# Patient Record
Sex: FEMALE | Race: WHITE | Employment: OTHER | URBAN - METROPOLITAN AREA
[De-identification: names, ages, dates, MRNs, and addresses within clinical notes are randomized per-mention and may not be internally consistent; named-entity substitution may affect disease eponyms.]

---

## 2018-01-08 ENCOUNTER — OFFICE VISIT (OUTPATIENT)
Dept: OBGYN CLINIC | Age: 28
End: 2018-01-08
Payer: MEDICAID

## 2018-01-08 DIAGNOSIS — Z97.5 IUD (INTRAUTERINE DEVICE) IN PLACE: ICD-10-CM

## 2018-01-08 PROCEDURE — 1036F TOBACCO NON-USER: CPT | Performed by: OBSTETRICS & GYNECOLOGY

## 2018-01-08 PROCEDURE — 99203 OFFICE O/P NEW LOW 30 MIN: CPT | Performed by: OBSTETRICS & GYNECOLOGY

## 2018-01-08 PROCEDURE — G8421 BMI NOT CALCULATED: HCPCS | Performed by: OBSTETRICS & GYNECOLOGY

## 2018-01-08 PROCEDURE — G8484 FLU IMMUNIZE NO ADMIN: HCPCS | Performed by: OBSTETRICS & GYNECOLOGY

## 2018-01-08 PROCEDURE — G8427 DOCREV CUR MEDS BY ELIG CLIN: HCPCS | Performed by: OBSTETRICS & GYNECOLOGY

## 2018-01-08 RX ORDER — CALCIUM CARBONATE 500(1250)
500 TABLET ORAL DAILY
COMMUNITY
End: 2018-05-09

## 2018-01-08 RX ORDER — PREDNISONE 1 MG/1
5 TABLET ORAL DAILY
COMMUNITY
End: 2018-05-09

## 2018-01-08 RX ORDER — FLUOXETINE HYDROCHLORIDE 20 MG/1
20 CAPSULE ORAL DAILY
COMMUNITY
End: 2018-05-09

## 2018-01-08 NOTE — PROGRESS NOTES
will refer to PCP in [place] since she does not have one at this time. )   as well as  counseling on preventative health maintenance follow-up.

## 2018-05-09 ENCOUNTER — HOSPITAL ENCOUNTER (OUTPATIENT)
Age: 28
Setting detail: SPECIMEN
Discharge: HOME | End: 2018-05-09
Payer: MEDICAID

## 2018-05-09 ENCOUNTER — OFFICE VISIT (OUTPATIENT)
Dept: OBGYN CLINIC | Age: 28
End: 2018-05-09
Payer: MEDICAID

## 2018-05-09 VITALS
BODY MASS INDEX: 22.02 KG/M2 | WEIGHT: 129 LBS | HEIGHT: 64 IN | DIASTOLIC BLOOD PRESSURE: 70 MMHG | SYSTOLIC BLOOD PRESSURE: 124 MMHG

## 2018-05-09 DIAGNOSIS — Z01.419 PAP SMEAR, AS PART OF ROUTINE GYNECOLOGICAL EXAMINATION: Primary | ICD-10-CM

## 2018-05-09 DIAGNOSIS — Z30.432 ENCOUNTER FOR IUD REMOVAL: ICD-10-CM

## 2018-05-09 DIAGNOSIS — N92.1 IRREGULAR INTERMENSTRUAL BLEEDING: ICD-10-CM

## 2018-05-09 PROCEDURE — 58301 REMOVE INTRAUTERINE DEVICE: CPT | Performed by: OBSTETRICS & GYNECOLOGY

## 2018-05-09 PROCEDURE — 99395 PREV VISIT EST AGE 18-39: CPT | Performed by: OBSTETRICS & GYNECOLOGY

## 2018-05-09 RX ORDER — BIOTIN 10 MG
10 TABLET ORAL DAILY
COMMUNITY
End: 2020-09-04

## 2018-05-09 RX ORDER — MEDROXYPROGESTERONE ACETATE 150 MG/ML
150 INJECTION, SUSPENSION INTRAMUSCULAR
Qty: 1 ML | Refills: 3 | Status: SHIPPED | OUTPATIENT
Start: 2018-05-09 | End: 2019-03-21 | Stop reason: SDUPTHER

## 2018-05-15 ENCOUNTER — NURSE ONLY (OUTPATIENT)
Dept: OBGYN CLINIC | Age: 28
End: 2018-05-15
Payer: MEDICAID

## 2018-05-15 DIAGNOSIS — Z01.812 PRE-PROCEDURE LAB EXAM: ICD-10-CM

## 2018-05-15 DIAGNOSIS — N92.6 IRREGULAR MENSTRUAL BLEEDING: Primary | ICD-10-CM

## 2018-05-15 LAB
CONTROL: PRESENT
PREGNANCY TEST URINE, POC: NEGATIVE

## 2018-05-15 PROCEDURE — 99211 OFF/OP EST MAY X REQ PHY/QHP: CPT | Performed by: NURSE PRACTITIONER

## 2018-05-15 PROCEDURE — 81025 URINE PREGNANCY TEST: CPT | Performed by: NURSE PRACTITIONER

## 2018-05-15 RX ORDER — MEDROXYPROGESTERONE ACETATE 150 MG/ML
150 INJECTION, SUSPENSION INTRAMUSCULAR ONCE
Status: COMPLETED | OUTPATIENT
Start: 2018-05-15 | End: 2018-05-15

## 2018-05-15 RX ADMIN — MEDROXYPROGESTERONE ACETATE 150 MG: 150 INJECTION, SUSPENSION INTRAMUSCULAR at 11:03

## 2018-06-04 LAB — CYTOLOGY REPORT: NORMAL

## 2018-06-21 ENCOUNTER — TELEPHONE (OUTPATIENT)
Dept: OBGYN CLINIC | Age: 28
End: 2018-06-21

## 2018-06-21 DIAGNOSIS — N76.0 BV (BACTERIAL VAGINOSIS): Primary | ICD-10-CM

## 2018-06-21 DIAGNOSIS — B96.89 BV (BACTERIAL VAGINOSIS): Primary | ICD-10-CM

## 2018-06-22 RX ORDER — METRONIDAZOLE 500 MG/1
500 TABLET ORAL 2 TIMES DAILY
Qty: 14 TABLET | Refills: 0 | Status: SHIPPED | OUTPATIENT
Start: 2018-06-22 | End: 2018-06-29

## 2018-07-09 ENCOUNTER — HOSPITAL ENCOUNTER (OUTPATIENT)
Age: 28
Setting detail: SPECIMEN
Discharge: HOME | End: 2018-07-09
Payer: MEDICAID

## 2018-07-09 ENCOUNTER — PROCEDURE VISIT (OUTPATIENT)
Dept: OBGYN CLINIC | Age: 28
End: 2018-07-09
Payer: MEDICAID

## 2018-07-09 VITALS — SYSTOLIC BLOOD PRESSURE: 120 MMHG | HEIGHT: 64 IN | DIASTOLIC BLOOD PRESSURE: 78 MMHG

## 2018-07-09 DIAGNOSIS — R87.612 LGSIL ON PAP SMEAR OF CERVIX: Primary | ICD-10-CM

## 2018-07-09 PROCEDURE — 57454 BX/CURETT OF CERVIX W/SCOPE: CPT | Performed by: OBSTETRICS & GYNECOLOGY

## 2018-07-12 LAB — SURGICAL PATHOLOGY REPORT: NORMAL

## 2018-07-13 ENCOUNTER — TELEPHONE (OUTPATIENT)
Dept: OBGYN CLINIC | Age: 28
End: 2018-07-13

## 2018-07-19 ENCOUNTER — HOSPITAL ENCOUNTER (OUTPATIENT)
Dept: PREADMISSION TESTING | Age: 28
Discharge: HOME | End: 2018-07-23
Payer: MEDICAID

## 2018-07-19 VITALS
HEIGHT: 64 IN | DIASTOLIC BLOOD PRESSURE: 87 MMHG | TEMPERATURE: 97.5 F | HEART RATE: 75 BPM | BODY MASS INDEX: 22.2 KG/M2 | SYSTOLIC BLOOD PRESSURE: 122 MMHG | WEIGHT: 130 LBS | OXYGEN SATURATION: 99 % | RESPIRATION RATE: 16 BRPM

## 2018-07-19 LAB
ABSOLUTE EOS #: 0.5 K/UL (ref 0–0.4)
ABSOLUTE IMMATURE GRANULOCYTE: ABNORMAL K/UL (ref 0–0.3)
ABSOLUTE LYMPH #: 2.6 K/UL (ref 1–4.8)
ABSOLUTE MONO #: 0.4 K/UL (ref 0.1–1.3)
ANION GAP SERPL CALCULATED.3IONS-SCNC: 12 MMOL/L (ref 9–17)
BASOPHILS # BLD: 1 % (ref 0–2)
BASOPHILS ABSOLUTE: 0.1 K/UL (ref 0–0.2)
BILIRUBIN URINE: NEGATIVE
BUN BLDV-MCNC: 12 MG/DL (ref 6–20)
BUN/CREAT BLD: ABNORMAL (ref 9–20)
CALCIUM SERPL-MCNC: 8.7 MG/DL (ref 8.6–10.4)
CHLORIDE BLD-SCNC: 104 MMOL/L (ref 98–107)
CO2: 23 MMOL/L (ref 20–31)
COLOR: YELLOW
COMMENT UA: NORMAL
CREAT SERPL-MCNC: 0.56 MG/DL (ref 0.5–0.9)
DIFFERENTIAL TYPE: ABNORMAL
EKG ATRIAL RATE: 75 BPM
EKG P AXIS: 5 DEGREES
EKG P-R INTERVAL: 140 MS
EKG Q-T INTERVAL: 376 MS
EKG QRS DURATION: 90 MS
EKG QTC CALCULATION (BAZETT): 419 MS
EKG R AXIS: 77 DEGREES
EKG T AXIS: 33 DEGREES
EKG VENTRICULAR RATE: 75 BPM
EOSINOPHILS RELATIVE PERCENT: 6 % (ref 0–4)
GFR AFRICAN AMERICAN: >60 ML/MIN
GFR NON-AFRICAN AMERICAN: >60 ML/MIN
GFR SERPL CREATININE-BSD FRML MDRD: ABNORMAL ML/MIN/{1.73_M2}
GFR SERPL CREATININE-BSD FRML MDRD: ABNORMAL ML/MIN/{1.73_M2}
GLUCOSE BLD-MCNC: 101 MG/DL (ref 70–99)
GLUCOSE URINE: NEGATIVE
HCG QUANTITATIVE: <1 IU/L
HCT VFR BLD CALC: 44.5 % (ref 36–46)
HEMOGLOBIN: 14.9 G/DL (ref 12–16)
IMMATURE GRANULOCYTES: ABNORMAL %
INR BLD: 1
KETONES, URINE: NEGATIVE
LEUKOCYTE ESTERASE, URINE: NEGATIVE
LYMPHOCYTES # BLD: 32 % (ref 24–44)
MCH RBC QN AUTO: 30.7 PG (ref 26–34)
MCHC RBC AUTO-ENTMCNC: 33.4 G/DL (ref 31–37)
MCV RBC AUTO: 91.7 FL (ref 80–100)
MONOCYTES # BLD: 5 % (ref 1–7)
NITRITE, URINE: NEGATIVE
NRBC AUTOMATED: ABNORMAL PER 100 WBC
PARTIAL THROMBOPLASTIN TIME: 32.2 SEC (ref 23–31)
PDW BLD-RTO: 13.6 % (ref 11.5–14.9)
PH UA: 6 (ref 5–8)
PLATELET # BLD: 279 K/UL (ref 150–450)
PLATELET ESTIMATE: ABNORMAL
PMV BLD AUTO: 9.5 FL (ref 6–12)
POTASSIUM SERPL-SCNC: 4.1 MMOL/L (ref 3.7–5.3)
PROTEIN UA: NEGATIVE
PROTHROMBIN TIME: 10.1 SEC (ref 9.7–12)
RBC # BLD: 4.86 M/UL (ref 4–5.2)
RBC # BLD: ABNORMAL 10*6/UL
SEG NEUTROPHILS: 56 % (ref 36–66)
SEGMENTED NEUTROPHILS ABSOLUTE COUNT: 4.6 K/UL (ref 1.3–9.1)
SODIUM BLD-SCNC: 139 MMOL/L (ref 135–144)
SPECIFIC GRAVITY UA: 1.02 (ref 1–1.03)
TURBIDITY: CLEAR
URINE HGB: NEGATIVE
UROBILINOGEN, URINE: NORMAL
WBC # BLD: 8.1 K/UL (ref 3.5–11)
WBC # BLD: ABNORMAL 10*3/UL

## 2018-07-19 PROCEDURE — 85610 PROTHROMBIN TIME: CPT

## 2018-07-19 PROCEDURE — 93005 ELECTROCARDIOGRAM TRACING: CPT

## 2018-07-19 PROCEDURE — 80048 BASIC METABOLIC PNL TOTAL CA: CPT

## 2018-07-19 PROCEDURE — 81003 URINALYSIS AUTO W/O SCOPE: CPT

## 2018-07-19 PROCEDURE — 84702 CHORIONIC GONADOTROPIN TEST: CPT

## 2018-07-19 PROCEDURE — 85730 THROMBOPLASTIN TIME PARTIAL: CPT

## 2018-07-19 PROCEDURE — 36415 COLL VENOUS BLD VENIPUNCTURE: CPT

## 2018-07-19 PROCEDURE — 85025 COMPLETE CBC W/AUTO DIFF WBC: CPT

## 2018-07-20 ENCOUNTER — ANESTHESIA EVENT (OUTPATIENT)
Dept: OPERATING ROOM | Age: 28
End: 2018-07-20
Payer: MEDICAID

## 2018-07-20 RX ORDER — SODIUM CHLORIDE 0.9 % (FLUSH) 0.9 %
10 SYRINGE (ML) INJECTION PRN
Status: CANCELLED | OUTPATIENT
Start: 2018-07-20

## 2018-07-20 RX ORDER — SODIUM CHLORIDE 0.9 % (FLUSH) 0.9 %
10 SYRINGE (ML) INJECTION EVERY 12 HOURS SCHEDULED
Status: CANCELLED | OUTPATIENT
Start: 2018-07-20

## 2018-07-20 RX ORDER — LIDOCAINE HYDROCHLORIDE 10 MG/ML
1 INJECTION, SOLUTION EPIDURAL; INFILTRATION; INTRACAUDAL; PERINEURAL
Status: CANCELLED | OUTPATIENT
Start: 2018-07-20 | End: 2018-07-20

## 2018-07-20 RX ORDER — SODIUM CHLORIDE, SODIUM LACTATE, POTASSIUM CHLORIDE, CALCIUM CHLORIDE 600; 310; 30; 20 MG/100ML; MG/100ML; MG/100ML; MG/100ML
INJECTION, SOLUTION INTRAVENOUS CONTINUOUS
Status: CANCELLED | OUTPATIENT
Start: 2018-07-20

## 2018-07-31 ENCOUNTER — HOSPITAL ENCOUNTER (OUTPATIENT)
Age: 28
Setting detail: OUTPATIENT SURGERY
Discharge: HOME | End: 2018-07-31
Attending: OBSTETRICS & GYNECOLOGY | Admitting: OBSTETRICS & GYNECOLOGY
Payer: MEDICAID

## 2018-07-31 ENCOUNTER — ANESTHESIA (OUTPATIENT)
Dept: OPERATING ROOM | Age: 28
End: 2018-07-31
Payer: MEDICAID

## 2018-07-31 VITALS — TEMPERATURE: 96.6 F | OXYGEN SATURATION: 99 % | DIASTOLIC BLOOD PRESSURE: 57 MMHG | SYSTOLIC BLOOD PRESSURE: 111 MMHG

## 2018-07-31 VITALS
TEMPERATURE: 98.2 F | DIASTOLIC BLOOD PRESSURE: 61 MMHG | HEART RATE: 70 BPM | RESPIRATION RATE: 16 BRPM | SYSTOLIC BLOOD PRESSURE: 120 MMHG | BODY MASS INDEX: 22.2 KG/M2 | OXYGEN SATURATION: 96 % | WEIGHT: 130 LBS | HEIGHT: 64 IN

## 2018-07-31 DIAGNOSIS — Z98.890 S/P LEEP (LOOP ELECTROSURGICAL EXCISION PROCEDURE): Primary | ICD-10-CM

## 2018-07-31 PROBLEM — N87.1 DYSPLASIA OF CERVIX, HIGH GRADE CIN 2: Status: ACTIVE | Noted: 2018-07-31

## 2018-07-31 LAB
-: NORMAL
HCG, PREGNANCY URINE (POC): NEGATIVE

## 2018-07-31 PROCEDURE — 3600000012 HC SURGERY LEVEL 2 ADDTL 15MIN: Performed by: OBSTETRICS & GYNECOLOGY

## 2018-07-31 PROCEDURE — 3700000000 HC ANESTHESIA ATTENDED CARE: Performed by: OBSTETRICS & GYNECOLOGY

## 2018-07-31 PROCEDURE — 7100000030 HC ASPR PHASE II RECOVERY - FIRST 15 MIN: Performed by: OBSTETRICS & GYNECOLOGY

## 2018-07-31 PROCEDURE — 84703 CHORIONIC GONADOTROPIN ASSAY: CPT

## 2018-07-31 PROCEDURE — 7100000011 HC PHASE II RECOVERY - ADDTL 15 MIN: Performed by: OBSTETRICS & GYNECOLOGY

## 2018-07-31 PROCEDURE — 3600000002 HC SURGERY LEVEL 2 BASE: Performed by: OBSTETRICS & GYNECOLOGY

## 2018-07-31 PROCEDURE — 2580000003 HC RX 258: Performed by: ANESTHESIOLOGY

## 2018-07-31 PROCEDURE — 2500000003 HC RX 250 WO HCPCS: Performed by: OBSTETRICS & GYNECOLOGY

## 2018-07-31 PROCEDURE — 3700000001 HC ADD 15 MINUTES (ANESTHESIA): Performed by: OBSTETRICS & GYNECOLOGY

## 2018-07-31 PROCEDURE — 88307 TISSUE EXAM BY PATHOLOGIST: CPT

## 2018-07-31 PROCEDURE — 2500000003 HC RX 250 WO HCPCS: Performed by: ANESTHESIOLOGY

## 2018-07-31 PROCEDURE — 7100000001 HC PACU RECOVERY - ADDTL 15 MIN: Performed by: OBSTETRICS & GYNECOLOGY

## 2018-07-31 PROCEDURE — 57460 BX OF CERVIX W/SCOPE LEEP: CPT | Performed by: OBSTETRICS & GYNECOLOGY

## 2018-07-31 PROCEDURE — 88342 IMHCHEM/IMCYTCHM 1ST ANTB: CPT

## 2018-07-31 PROCEDURE — 6370000000 HC RX 637 (ALT 250 FOR IP): Performed by: OBSTETRICS & GYNECOLOGY

## 2018-07-31 PROCEDURE — 2709999900 HC NON-CHARGEABLE SUPPLY: Performed by: OBSTETRICS & GYNECOLOGY

## 2018-07-31 PROCEDURE — 7100000000 HC PACU RECOVERY - FIRST 15 MIN: Performed by: OBSTETRICS & GYNECOLOGY

## 2018-07-31 PROCEDURE — 7100000031 HC ASPR PHASE II RECOVERY - ADDTL 15 MIN: Performed by: OBSTETRICS & GYNECOLOGY

## 2018-07-31 PROCEDURE — 7100000010 HC PHASE II RECOVERY - FIRST 15 MIN: Performed by: OBSTETRICS & GYNECOLOGY

## 2018-07-31 PROCEDURE — 6360000002 HC RX W HCPCS: Performed by: ANESTHESIOLOGY

## 2018-07-31 RX ORDER — DEXAMETHASONE SODIUM PHOSPHATE 4 MG/ML
INJECTION, SOLUTION INTRA-ARTICULAR; INTRALESIONAL; INTRAMUSCULAR; INTRAVENOUS; SOFT TISSUE PRN
Status: DISCONTINUED | OUTPATIENT
Start: 2018-07-31 | End: 2018-07-31 | Stop reason: SDUPTHER

## 2018-07-31 RX ORDER — ACETIC ACID 5 %
LIQUID (ML) MISCELLANEOUS PRN
Status: DISCONTINUED | OUTPATIENT
Start: 2018-07-31 | End: 2018-07-31 | Stop reason: HOSPADM

## 2018-07-31 RX ORDER — SODIUM CHLORIDE, SODIUM LACTATE, POTASSIUM CHLORIDE, CALCIUM CHLORIDE 600; 310; 30; 20 MG/100ML; MG/100ML; MG/100ML; MG/100ML
INJECTION, SOLUTION INTRAVENOUS CONTINUOUS
Status: DISCONTINUED | OUTPATIENT
Start: 2018-07-31 | End: 2018-07-31 | Stop reason: HOSPADM

## 2018-07-31 RX ORDER — IODINE SOLUTION STRONG 5% (LUGOL'S) 5 %
SOLUTION ORAL PRN
Status: DISCONTINUED | OUTPATIENT
Start: 2018-07-31 | End: 2018-07-31 | Stop reason: HOSPADM

## 2018-07-31 RX ORDER — SODIUM CHLORIDE, SODIUM LACTATE, POTASSIUM CHLORIDE, CALCIUM CHLORIDE 600; 310; 30; 20 MG/100ML; MG/100ML; MG/100ML; MG/100ML
INJECTION, SOLUTION INTRAVENOUS CONTINUOUS PRN
Status: DISCONTINUED | OUTPATIENT
Start: 2018-07-31 | End: 2018-07-31 | Stop reason: SDUPTHER

## 2018-07-31 RX ORDER — FERRIC SUBSULFATE 20-22G/100
SOLUTION, NON-ORAL MISCELLANEOUS PRN
Status: DISCONTINUED | OUTPATIENT
Start: 2018-07-31 | End: 2018-07-31 | Stop reason: HOSPADM

## 2018-07-31 RX ORDER — ONDANSETRON 2 MG/ML
4 INJECTION INTRAMUSCULAR; INTRAVENOUS
Status: DISCONTINUED | OUTPATIENT
Start: 2018-07-31 | End: 2018-07-31 | Stop reason: HOSPADM

## 2018-07-31 RX ORDER — SODIUM CHLORIDE 0.9 % (FLUSH) 0.9 %
10 SYRINGE (ML) INJECTION PRN
Status: DISCONTINUED | OUTPATIENT
Start: 2018-07-31 | End: 2018-07-31 | Stop reason: HOSPADM

## 2018-07-31 RX ORDER — IBUPROFEN 800 MG/1
800 TABLET ORAL EVERY 6 HOURS PRN
Qty: 30 TABLET | Refills: 0 | Status: SHIPPED | OUTPATIENT
Start: 2018-07-31 | End: 2019-06-10

## 2018-07-31 RX ORDER — MEPERIDINE HYDROCHLORIDE 50 MG/ML
12.5 INJECTION INTRAMUSCULAR; INTRAVENOUS; SUBCUTANEOUS EVERY 5 MIN PRN
Status: DISCONTINUED | OUTPATIENT
Start: 2018-07-31 | End: 2018-07-31 | Stop reason: HOSPADM

## 2018-07-31 RX ORDER — PROPOFOL 10 MG/ML
INJECTION, EMULSION INTRAVENOUS PRN
Status: DISCONTINUED | OUTPATIENT
Start: 2018-07-31 | End: 2018-07-31 | Stop reason: SDUPTHER

## 2018-07-31 RX ORDER — LIDOCAINE HYDROCHLORIDE 10 MG/ML
1 INJECTION, SOLUTION EPIDURAL; INFILTRATION; INTRACAUDAL; PERINEURAL
Status: DISCONTINUED | OUTPATIENT
Start: 2018-07-31 | End: 2018-07-31 | Stop reason: HOSPADM

## 2018-07-31 RX ORDER — SODIUM CHLORIDE 0.9 % (FLUSH) 0.9 %
10 SYRINGE (ML) INJECTION EVERY 12 HOURS SCHEDULED
Status: DISCONTINUED | OUTPATIENT
Start: 2018-07-31 | End: 2018-07-31 | Stop reason: HOSPADM

## 2018-07-31 RX ORDER — FENTANYL CITRATE 50 UG/ML
INJECTION, SOLUTION INTRAMUSCULAR; INTRAVENOUS PRN
Status: DISCONTINUED | OUTPATIENT
Start: 2018-07-31 | End: 2018-07-31 | Stop reason: SDUPTHER

## 2018-07-31 RX ORDER — OXYCODONE HYDROCHLORIDE AND ACETAMINOPHEN 5; 325 MG/1; MG/1
1 TABLET ORAL EVERY 4 HOURS PRN
Status: DISCONTINUED | OUTPATIENT
Start: 2018-07-31 | End: 2018-07-31 | Stop reason: HOSPADM

## 2018-07-31 RX ORDER — DIPHENHYDRAMINE HYDROCHLORIDE 50 MG/ML
12.5 INJECTION INTRAMUSCULAR; INTRAVENOUS
Status: DISCONTINUED | OUTPATIENT
Start: 2018-07-31 | End: 2018-07-31 | Stop reason: HOSPADM

## 2018-07-31 RX ORDER — ONDANSETRON 2 MG/ML
INJECTION INTRAMUSCULAR; INTRAVENOUS PRN
Status: DISCONTINUED | OUTPATIENT
Start: 2018-07-31 | End: 2018-07-31 | Stop reason: SDUPTHER

## 2018-07-31 RX ORDER — MORPHINE SULFATE 2 MG/ML
2 INJECTION, SOLUTION INTRAMUSCULAR; INTRAVENOUS EVERY 5 MIN PRN
Status: DISCONTINUED | OUTPATIENT
Start: 2018-07-31 | End: 2018-07-31 | Stop reason: HOSPADM

## 2018-07-31 RX ORDER — HYDROCODONE BITARTRATE AND ACETAMINOPHEN 5; 325 MG/1; MG/1
1 TABLET ORAL EVERY 6 HOURS PRN
Qty: 5 TABLET | Refills: 0 | Status: SHIPPED | OUTPATIENT
Start: 2018-07-31 | End: 2018-08-07

## 2018-07-31 RX ORDER — LIDOCAINE HYDROCHLORIDE AND EPINEPHRINE 10; 10 MG/ML; UG/ML
INJECTION, SOLUTION INFILTRATION; PERINEURAL PRN
Status: DISCONTINUED | OUTPATIENT
Start: 2018-07-31 | End: 2018-07-31 | Stop reason: HOSPADM

## 2018-07-31 RX ORDER — LABETALOL HYDROCHLORIDE 5 MG/ML
5 INJECTION, SOLUTION INTRAVENOUS EVERY 10 MIN PRN
Status: DISCONTINUED | OUTPATIENT
Start: 2018-07-31 | End: 2018-07-31 | Stop reason: HOSPADM

## 2018-07-31 RX ORDER — ONDANSETRON 4 MG/1
4 TABLET, ORALLY DISINTEGRATING ORAL EVERY 8 HOURS PRN
Qty: 6 TABLET | Refills: 0 | Status: SHIPPED | OUTPATIENT
Start: 2018-07-31 | End: 2019-06-10

## 2018-07-31 RX ORDER — MIDAZOLAM HYDROCHLORIDE 1 MG/ML
INJECTION INTRAMUSCULAR; INTRAVENOUS PRN
Status: DISCONTINUED | OUTPATIENT
Start: 2018-07-31 | End: 2018-07-31 | Stop reason: SDUPTHER

## 2018-07-31 RX ORDER — LIDOCAINE HYDROCHLORIDE 20 MG/ML
INJECTION, SOLUTION EPIDURAL; INFILTRATION; INTRACAUDAL; PERINEURAL PRN
Status: DISCONTINUED | OUTPATIENT
Start: 2018-07-31 | End: 2018-07-31 | Stop reason: SDUPTHER

## 2018-07-31 RX ADMIN — DEXAMETHASONE SODIUM PHOSPHATE 4 MG: 4 INJECTION, SOLUTION INTRAMUSCULAR; INTRAVENOUS at 10:22

## 2018-07-31 RX ADMIN — FENTANYL CITRATE 100 MCG: 50 INJECTION, SOLUTION INTRAMUSCULAR; INTRAVENOUS at 10:05

## 2018-07-31 RX ADMIN — SODIUM CHLORIDE, POTASSIUM CHLORIDE, SODIUM LACTATE AND CALCIUM CHLORIDE: 600; 310; 30; 20 INJECTION, SOLUTION INTRAVENOUS at 10:00

## 2018-07-31 RX ADMIN — ONDANSETRON 4 MG: 2 INJECTION INTRAMUSCULAR; INTRAVENOUS at 10:22

## 2018-07-31 RX ADMIN — LIDOCAINE HYDROCHLORIDE 50 MG: 20 INJECTION, SOLUTION EPIDURAL; INFILTRATION; INTRACAUDAL; PERINEURAL at 10:05

## 2018-07-31 RX ADMIN — MIDAZOLAM 2 MG: 1 INJECTION INTRAMUSCULAR; INTRAVENOUS at 10:05

## 2018-07-31 RX ADMIN — SODIUM CHLORIDE, POTASSIUM CHLORIDE, SODIUM LACTATE AND CALCIUM CHLORIDE: 600; 310; 30; 20 INJECTION, SOLUTION INTRAVENOUS at 07:53

## 2018-07-31 RX ADMIN — PROPOFOL 150 MG: 10 INJECTION, EMULSION INTRAVENOUS at 10:05

## 2018-07-31 ASSESSMENT — PULMONARY FUNCTION TESTS
PIF_VALUE: 4
PIF_VALUE: 16
PIF_VALUE: 1
PIF_VALUE: 11
PIF_VALUE: 12
PIF_VALUE: 12
PIF_VALUE: 3
PIF_VALUE: 2
PIF_VALUE: 4
PIF_VALUE: 3
PIF_VALUE: 12
PIF_VALUE: 2
PIF_VALUE: 3
PIF_VALUE: 11
PIF_VALUE: 12
PIF_VALUE: 11
PIF_VALUE: 12
PIF_VALUE: 3
PIF_VALUE: 13
PIF_VALUE: 12
PIF_VALUE: 16
PIF_VALUE: 3
PIF_VALUE: 13
PIF_VALUE: 12
PIF_VALUE: 11
PIF_VALUE: 0
PIF_VALUE: 11
PIF_VALUE: 11
PIF_VALUE: 0
PIF_VALUE: 12
PIF_VALUE: 12
PIF_VALUE: 13
PIF_VALUE: 12
PIF_VALUE: 11
PIF_VALUE: 0
PIF_VALUE: 12
PIF_VALUE: 4
PIF_VALUE: 2

## 2018-07-31 ASSESSMENT — PAIN - FUNCTIONAL ASSESSMENT: PAIN_FUNCTIONAL_ASSESSMENT: 0-10

## 2018-07-31 ASSESSMENT — PAIN SCALES - GENERAL
PAINLEVEL_OUTOF10: 3
PAINLEVEL_OUTOF10: 3
PAINLEVEL_OUTOF10: 0

## 2018-07-31 ASSESSMENT — PAIN DESCRIPTION - DESCRIPTORS: DESCRIPTORS: CRAMPING

## 2018-07-31 ASSESSMENT — PAIN DESCRIPTION - LOCATION: LOCATION: ABDOMEN

## 2018-07-31 ASSESSMENT — ENCOUNTER SYMPTOMS
STRIDOR: 0
SHORTNESS OF BREATH: 0

## 2018-07-31 ASSESSMENT — PAIN DESCRIPTION - PAIN TYPE: TYPE: SURGICAL PAIN

## 2018-07-31 ASSESSMENT — PAIN DESCRIPTION - ORIENTATION: ORIENTATION: MID

## 2018-07-31 ASSESSMENT — LIFESTYLE VARIABLES: SMOKING_STATUS: 0

## 2018-07-31 NOTE — ANESTHESIA POSTPROCEDURE EVALUATION
POST- ANESTHESIA EVALUATION       Pt Name: Felton Blair  MRN: [id number]  YOB: 1990  Date of evaluation: 7/31/2018  Time:  3:28 PM      /61   Pulse 70   Temp 98.2 °F (36.8 °C) (Temporal)   Resp 16   Ht 5' 4\" (1.626 m)   Wt 130 lb (59 kg)   LMP 06/05/2018   SpO2 96%   BMI 22.31 kg/m²      Consciousness Level  Awake  Cardiopulmonary Status  Stable  Pain Adequately Treated YES  Nausea / Vomiting  NO  Adequate Hydration  YES  Anesthesia Related Complications NONE      Electronically signed by Chinyere Haque MD on 7/31/2018 at 3:28 PM       Department of Anesthesiology  Postprocedure Note    Patient: Felton Blair  MRN: [id number]  YOB: 1990  Date of evaluation: 7/31/2018  Time:  3:28 PM     Procedure Summary     Date:  07/31/18 Room / Location:  [address] [place] [address] / [address] [place] [address] Doug Santos    Anesthesia Start:  6290 Anesthesia Stop:  9332    Procedure:  LEEP W/TOP HAT & COLPOSCOPY W/DEPO (N/A ) Diagnosis:  (SHEYLA #3)    Surgeon:  Sherren Littler, DO Responsible Provider:  Kristel Hall MD    Anesthesia Type:  general ASA Status:  2          Anesthesia Type: general    Arina Phase I: Arina Score: 10    Arina Phase II: Arina Score: 10    Last vitals: Reviewed and per EMR flowsheets.        Anesthesia Post Evaluation

## 2018-07-31 NOTE — OP NOTE
Operative Note  Department of Obstetrics and Gynecology  [facility]       Patient: John Newby   : 1990  MRN: [id number]       Acct: [de-identified]   PCP: No primary care provider on file. Date of Procedure: 18    Pre-operative Diagnosis: 32 y.o. female , history of abnormal Pap Smear, history of abnormal cervical biopsy, SHEYLA II and SHEYLA III    Post-operative Diagnosis: same as above    Procedure: LEEP with top hat, Depo-Provera injection in left gluteal muscle    Surgeon: Dr. Isrrael Amado  Assistants: Analilia Vasquez DO; EDNA Ambrose, FROYLAN Bess    Anesthesia: general via LMA    Indications: history of abnormal Pap Smear, history of abnormal cervical biopsy, SHEYLA II and SHEYLA III    Procedure Details: The patient was seen in the pre-operative area. The procedure risk and complications were reviewed. The consent , labs , and H&P were reviewed. The patient had all of her questions answered. The patient was moved to the operative suite where she was timed out and then placed under general anesthesia by the anesthesiologist.  The patient was placed in the dorsal lithotomy position utilizing candy-cane stirrups and prepped and draped in the normal sterile fashion. A Bi-Valve insulated speculum was placed along the posterior vaginal wall the cervix had 2-3% acetic acid applied. The colposcope was utilized to view the cervix under low and high magnification with white filters, see findings section. Cervical block was undergone with 1% lidocaine with epinephrine. Utilizing Lugol's iodine, this was applied to the cervix . Areas of poor uptake were not noted. A leep was preformed using the # 15 mm loop for the ectocervix. Patient desires future child bearing and guard was utilized. Top hat was performed utilizing a 10mm square loop electrode  The total depth of the conization as measured on the field was 9.5 mm. Specimen was sent to pathology.     The base of the conization was cauterized with a ball tip and there was an additional 2 mm desiccation circumferentially and that 2 mm desiccation was taken to a 7 mm depth. There was noted to be adequate hemostasis of the base of the cervix and monsels solution was applied. The patient tolerated the procedure well, and she was taken to PACU in stable condition. Sponge, needle and instrument counts were called for and found to be correct. Suction Evacuation was used throughout the case. Prior to reversal of anesthesia, patient was given Depo-Provera injection in left gluteal muscle. Bandaid applied.          Dr. Evangelina Vang was present for the entire operation. Findings:      Colposcopy: The colposcope was utilized on high and low magnification. A plain white light was implemented after 3% of acetic acid was applied to the cervix. There were Aceto White Epithelium changes at the 12 and 6 o'clock positions. No Mosaic Changes, Punctation, or Irregular Vessels seen.       Lugols: two areas of poor uptake at the 12 and 6 o'clock positions     LEEP: Completed to 7 mm depth     Estimated Blood Loss: Minimal  Drains:  None  Total IV Fluids: 1000ml  Urine output: striaght catheterized prior to procedure    Specimens: Ectocervix with stitch at 12 O'clock and Endocervical top hat    Instrument and Sponge Count: Correct  Complications: none  Condition: stable, transfer to post anesthesia recovery      The patient will return Dr. Gonzalez Ing office in 2 weeks. We will review her pathology report and recommendations for path report. She was counseled with her family that she is to report any temperature more than 100.4 F, pelvic pain, or heavy vaginal bleeding; She is to refrain from lifting of more than 5 lbs, intercourse douching or tampons; No hot tubs baths lakes or pools. The patient voiced understanding of the above counseling in pre-op.     Hortensia Howell DO  Ob/Gyn Resident  7/31/2018, 10:48 AM        Date: 7/31/2018  Time: 4:11

## 2018-07-31 NOTE — ANESTHESIA PRE PROCEDURE
Department of Anesthesiology  Preprocedure Note       Name:  Ric Benitez   Age:  32 y.o.  :  1990                                          MRN:  [id number]         Date:  2018      Surgeon: Dennise Grove):  Connor Robison DO    Procedure: Procedure(s):  LEEP W/TOP HAT & COLPOSCOPY W/DEPO    Medications prior to admission:   Prior to Admission medications    Medication Sig Start Date End Date Taking?  Authorizing Provider   Biotin 10 MG tablet Take 10 mg by mouth daily    Historical Provider, MD   medroxyPROGESTERone (DEPO-PROVERA) 150 MG/ML injection Inject 1 mL into the muscle every 3 months 18   Connor Robison DO       Current medications:    Current Facility-Administered Medications   Medication Dose Route Frequency Provider Last Rate Last Dose    lactated ringers infusion   Intravenous Continuous Nany New  mL/hr at 18 0753      lidocaine PF 1 % injection 1 mL  1 mL Intradermal Once PRN Nany New MD        sodium chloride flush 0.9 % injection 10 mL  10 mL Intravenous 2 times per day Nany New MD        sodium chloride flush 0.9 % injection 10 mL  10 mL Intravenous PRN Nany New MD           Allergies:  No Known Allergies    Problem List:    Patient Active Problem List   Diagnosis Code    SVT (supraventricular tachycardia) ([facility] I47.1    Dysautonomia orthostatic hypotension syndrome ([address] G90.3    Arthritis M19.90    IUD (intrauterine device) in place Z97.5       Past Medical History:        Diagnosis Date    Rheumatoid arthritis ([address]     no medications    SVT (supraventricular tachycardia) ([address] 2014       Past Surgical History:        Procedure Laterality Date    ABLATION OF DYSRHYTHMIC FOCUS      cryoablation svt    COLPOSCOPY  2018    LGSIL    INTRAUTERINE DEVICE INSERTION  2017    Paraguard, later removed       Social History:    Social History   Substance Use Topics    Smoking status: Current Every Day Smoker found for: PHART, PO2ART, ORC4DTF, RUM3NDO, BEART, Z8HSYCQA     Type & Screen (If Applicable):  No results found for: LABABO, [address]    Anesthesia Evaluation  Patient summary reviewed no history of anesthetic complications:   Airway: Mallampati: II  TM distance: >3 FB   Neck ROM: full  Mouth opening: > = 3 FB Dental: normal exam         Pulmonary:Negative Pulmonary ROS and normal exam  breath sounds clear to auscultation      (-) pneumonia, COPD, asthma, shortness of breath, recent URI, sleep apnea, rhonchi, wheezes, rales, stridor and not a current smoker          Patient smoked on day of surgery. Cardiovascular:  Exercise tolerance: good (>4 METS),   (+) dysrhythmias: SVT,     (-) pacemaker, hypertension, valvular problems/murmurs, past MI, CAD, CABG/stent,  angina,  CHF, orthopnea, PND,  JOHNSON, murmur, weak pulses,  friction rub, systolic click, carotid bruit,  JVD and peripheral edema      Rhythm: regular  Rate: normal           Beta Blocker:  Not on Beta Blocker         Neuro/Psych:   Negative Neuro/Psych ROS     (-) seizures, neuromuscular disease, TIA, CVA, headaches, psychiatric history and depression/anxiety            GI/Hepatic/Renal: Neg GI/Hepatic/Renal ROS       (-) hiatal hernia, GERD, PUD, hepatitis, liver disease, no renal disease, bowel prep and no morbid obesity       Endo/Other:    (+) : arthritis: rheumatoid. , no malignancy/cancer. (-) diabetes mellitus, hypothyroidism, hyperthyroidism, blood dyscrasia, no electrolyte abnormalities, no malignancy/cancer               Abdominal:           Vascular: negative vascular ROS. - PVD, DVT and PE. Anesthesia Plan      general     ASA 2       Induction: intravenous. MIPS: Postoperative opioids intended and Prophylactic antiemetics administered. Anesthetic plan and risks discussed with patient. Plan discussed with CRNA.                   Sandy Douglas MD   7/31/2018

## 2018-07-31 NOTE — H&P
been completed. The history and physical as well as all supporting surgical documentation will be forwarded to the pre-operative holding area.     The patient is aware that this procedure may not alleviate her symptoms. That there may be a necessity for a second surgery and that there may be an incomplete removal of abnormal tissue.       Donaldo Osorio DO  Ob/Gyn Resident  PGY3  [facility], [address]  7/31/2018 8:06 AM

## 2018-08-03 LAB — SURGICAL PATHOLOGY REPORT: NORMAL

## 2018-08-07 ENCOUNTER — TELEPHONE (OUTPATIENT)
Dept: OBGYN CLINIC | Age: 28
End: 2018-08-07

## 2018-08-08 NOTE — TELEPHONE ENCOUNTER
Moderate dysplasia noted with negative margins. Successful LEEP. Will need routine 12 month pap smear. Thank you.

## 2018-08-14 ENCOUNTER — OFFICE VISIT (OUTPATIENT)
Dept: OBGYN CLINIC | Age: 28
End: 2018-08-14

## 2018-08-14 VITALS — BODY MASS INDEX: 22.83 KG/M2 | SYSTOLIC BLOOD PRESSURE: 116 MMHG | DIASTOLIC BLOOD PRESSURE: 62 MMHG | WEIGHT: 133 LBS

## 2018-08-14 DIAGNOSIS — N90.89 VULVAR SKIN TAG: ICD-10-CM

## 2018-08-14 DIAGNOSIS — Z98.890 S/P LEEP (LOOP ELECTROSURGICAL EXCISION PROCEDURE): Primary | ICD-10-CM

## 2018-08-14 DIAGNOSIS — N87.1 DYSPLASIA OF CERVIX, HIGH GRADE CIN 2: ICD-10-CM

## 2018-08-14 PROCEDURE — 99024 POSTOP FOLLOW-UP VISIT: CPT | Performed by: OBSTETRICS & GYNECOLOGY

## 2018-08-14 NOTE — PROGRESS NOTES
examination is  performed. Specimen \"B\": Three H&E glass slides are received. Microscopic  examination is performed. POCT HCG, Prenancy, Ur   Result Value Ref Range    HCG, Pregnancy Urine (POC) NEGATIVE NEG    - NOT REPORTED            Assessment:      Diagnosis Orders   1. 7/31/18 LEEP with top hat, Depo-Provera injection in L gluteal muscle     2. Dysplasia of cervix, high grade SHEYLA 2     3. Vulvar skin tag          Patient Active Problem List    Diagnosis Date Noted    Vulvar skin tag 08/14/2018     Left side. Does not want removed or biopsied      Dysplasia of cervix, high grade SHEYLA 2 07/31/2018     LEEP 7/31/18  7/31/18 LEEP with top hat, Depo-Provera injection in L gluteal muscle 07/31/2018    Hx of IUD use 05/01/2017     Paraguard removed 5/10/18      SVT (supraventricular tachycardia) ([address] 07/02/2014     s/p ablation       Dysautonomia orthostatic hypotension syndrome ([facility] [address] [facility] 07/02/2014    Arthritis 07/02/2014          POD# 2 weeks   Procedure: LEEP   Stable   Pathology reviewed and found to be benign. Yes    Plan:   Return if symptoms worsen or fail to improve, for annual.   Continue with restrictions for 1 week   Pelvic rest. No lifting or intercourse. No baths or pools. No douching or tampons. Doing well.   Continue Depo Provera

## 2018-10-16 ENCOUNTER — NURSE ONLY (OUTPATIENT)
Dept: OBGYN CLINIC | Age: 28
End: 2018-10-16
Payer: MEDICAID

## 2018-10-16 DIAGNOSIS — Z30.42 SURVEILLANCE FOR DEPO-PROVERA CONTRACEPTION: Primary | ICD-10-CM

## 2018-10-16 PROCEDURE — 99211 OFF/OP EST MAY X REQ PHY/QHP: CPT | Performed by: NURSE PRACTITIONER

## 2018-10-16 RX ORDER — MEDROXYPROGESTERONE ACETATE 150 MG/ML
150 INJECTION, SUSPENSION INTRAMUSCULAR ONCE
Status: COMPLETED | OUTPATIENT
Start: 2018-10-16 | End: 2018-10-16

## 2018-10-16 RX ADMIN — MEDROXYPROGESTERONE ACETATE 150 MG: 150 INJECTION, SUSPENSION INTRAMUSCULAR at 09:16

## 2019-01-02 ENCOUNTER — NURSE ONLY (OUTPATIENT)
Dept: OBGYN CLINIC | Age: 29
End: 2019-01-02
Payer: MEDICAID

## 2019-01-02 DIAGNOSIS — Z30.42 DEPO-PROVERA CONTRACEPTIVE STATUS: Primary | ICD-10-CM

## 2019-01-02 PROCEDURE — 99211 OFF/OP EST MAY X REQ PHY/QHP: CPT | Performed by: OBSTETRICS & GYNECOLOGY

## 2019-01-02 RX ORDER — MEDROXYPROGESTERONE ACETATE 150 MG/ML
150 INJECTION, SUSPENSION INTRAMUSCULAR ONCE
Status: COMPLETED | OUTPATIENT
Start: 2019-01-02 | End: 2019-01-02

## 2019-01-02 RX ADMIN — MEDROXYPROGESTERONE ACETATE 150 MG: 150 INJECTION, SUSPENSION INTRAMUSCULAR at 15:19

## 2019-01-03 ENCOUNTER — TELEPHONE (OUTPATIENT)
Dept: OBGYN CLINIC | Age: 29
End: 2019-01-03

## 2019-01-03 DIAGNOSIS — N92.1 IRREGULAR INTERMENSTRUAL BLEEDING: Primary | ICD-10-CM

## 2019-01-04 DIAGNOSIS — N92.6 IRREGULAR BLEEDING: Primary | ICD-10-CM

## 2019-01-08 ENCOUNTER — HOSPITAL ENCOUNTER (OUTPATIENT)
Dept: ULTRASOUND IMAGING | Age: 29
Discharge: HOME | End: 2019-01-10
Payer: MEDICAID

## 2019-01-08 DIAGNOSIS — N92.6 IRREGULAR BLEEDING: ICD-10-CM

## 2019-01-08 PROCEDURE — 76830 TRANSVAGINAL US NON-OB: CPT

## 2019-01-08 PROCEDURE — 76856 US EXAM PELVIC COMPLETE: CPT

## 2019-01-14 ENCOUNTER — TELEPHONE (OUTPATIENT)
Dept: OBGYN CLINIC | Age: 29
End: 2019-01-14

## 2019-03-21 ENCOUNTER — TELEPHONE (OUTPATIENT)
Dept: OBGYN CLINIC | Age: 29
End: 2019-03-21

## 2019-03-21 DIAGNOSIS — N92.1 IRREGULAR INTERMENSTRUAL BLEEDING: ICD-10-CM

## 2019-03-21 RX ORDER — MEDROXYPROGESTERONE ACETATE 150 MG/ML
150 INJECTION, SUSPENSION INTRAMUSCULAR
Qty: 1 ML | Refills: 3 | Status: SHIPPED | OUTPATIENT
Start: 2019-03-21 | End: 2020-02-10 | Stop reason: SDUPTHER

## 2019-03-22 ENCOUNTER — NURSE ONLY (OUTPATIENT)
Dept: OBGYN CLINIC | Age: 29
End: 2019-03-22
Payer: MEDICAID

## 2019-03-22 DIAGNOSIS — Z30.42 DEPO-PROVERA CONTRACEPTIVE STATUS: Primary | ICD-10-CM

## 2019-03-22 PROCEDURE — 96372 THER/PROPH/DIAG INJ SC/IM: CPT | Performed by: OBSTETRICS & GYNECOLOGY

## 2019-03-22 RX ORDER — MEDROXYPROGESTERONE ACETATE 150 MG/ML
150 INJECTION, SUSPENSION INTRAMUSCULAR ONCE
Status: COMPLETED | OUTPATIENT
Start: 2019-03-22 | End: 2019-03-22

## 2019-03-22 RX ADMIN — MEDROXYPROGESTERONE ACETATE 150 MG: 150 INJECTION, SUSPENSION INTRAMUSCULAR at 09:47

## 2019-06-10 ENCOUNTER — HOSPITAL ENCOUNTER (OUTPATIENT)
Age: 29
Setting detail: SPECIMEN
Discharge: HOME | End: 2019-06-10
Payer: MEDICAID

## 2019-06-10 ENCOUNTER — OFFICE VISIT (OUTPATIENT)
Dept: OBGYN CLINIC | Age: 29
End: 2019-06-10
Payer: MEDICAID

## 2019-06-10 VITALS — WEIGHT: 144 LBS | DIASTOLIC BLOOD PRESSURE: 62 MMHG | SYSTOLIC BLOOD PRESSURE: 110 MMHG | BODY MASS INDEX: 24.72 KG/M2

## 2019-06-10 DIAGNOSIS — Z30.42 DEPO-PROVERA CONTRACEPTIVE STATUS: ICD-10-CM

## 2019-06-10 DIAGNOSIS — Z01.419 VISIT FOR GYNECOLOGIC EXAMINATION: Primary | ICD-10-CM

## 2019-06-10 PROCEDURE — 99395 PREV VISIT EST AGE 18-39: CPT | Performed by: OBSTETRICS & GYNECOLOGY

## 2019-06-10 PROCEDURE — 96372 THER/PROPH/DIAG INJ SC/IM: CPT | Performed by: OBSTETRICS & GYNECOLOGY

## 2019-06-10 RX ORDER — MEDROXYPROGESTERONE ACETATE 150 MG/ML
150 INJECTION, SUSPENSION INTRAMUSCULAR ONCE
Status: COMPLETED | OUTPATIENT
Start: 2019-06-10 | End: 2019-06-10

## 2019-06-10 RX ADMIN — MEDROXYPROGESTERONE ACETATE 150 MG: 150 INJECTION, SUSPENSION INTRAMUSCULAR at 15:29

## 2019-06-10 NOTE — PROGRESS NOTES
 Years of education: Not on file    Highest education level: Not on file   Occupational History    Not on file   Social Needs    Financial resource strain: Not on file    Food insecurity:     Worry: Not on file     Inability: Not on file    Transportation needs:     Medical: Not on file     Non-medical: Not on file   Tobacco Use    Smoking status: Current Every Day Smoker     Packs/day: 0.50     Types: Cigarettes    Smokeless tobacco: Never Used   Substance and Sexual Activity    Alcohol use: No    Drug use: No    Sexual activity: Yes     Partners: Male   Lifestyle    Physical activity:     Days per week: Not on file     Minutes per session: Not on file    Stress: Not on file   Relationships    Social connections:     Talks on phone: Not on file     Gets together: Not on file     Attends Congregation service: Not on file     Active member of club or organization: Not on file     Attends meetings of clubs or organizations: Not on file     Relationship status: Not on file    Intimate partner violence:     Fear of current or ex partner: Not on file     Emotionally abused: Not on file     Physically abused: Not on file     Forced sexual activity: Not on file   Other Topics Concern    Not on file   Social History Narrative    Not on file       MEDICATIONS:  Current Outpatient Medications   Medication Sig Dispense Refill    medroxyPROGESTERone (DEPO-PROVERA) 150 MG/ML injection Inject 1 mL into the muscle every 3 months 1 mL 3    Biotin 10 MG tablet Take 10 mg by mouth daily       Current Facility-Administered Medications   Medication Dose Route Frequency Provider Last Rate Last Dose    medroxyPROGESTERone (DEPO-PROVERA) injection 150 mg  150 mg Intramuscular Once Arnol Ross DO               ALLERGIES:  Allergies as of 06/10/2019    (No Known Allergies)       Symptoms of decreased mood absent      Immunization status: stated as current, but no records available.       Gynecologic History:     No LMP recorded. Patient has had an injection. Sexually Active: Yes    STD History: No     Permanent Sterilization: No   Reversible Birth Control: Yes Depo Provera        Hormone Replacement Exposure: No      Genetic Qualified Family History of Breast, Ovarian , Colon or Uterine Cancer: No     If YES see scanned worksheet. Preventative Health Testing:    Health Maintenance:  Health Maintenance Due   Topic Date Due    Pneumococcal 0-64 years Vaccine (1 of 1 - PPSV23) 10/24/1996    Varicella Vaccine (1 of 2 - 13+ 2-dose series) 10/24/2003    HIV screen  10/24/2005       Date of Last Pap Smear: 2018  Abnormal Pap Smear History: LSIL 5/9/18  Colposcopy History: SHEYLA 2-3 7/9/18 with subsequent LEEP  Date of Last Mammogram: no  Date of Last Colonoscopy:   Date of Last Bone Density:      ________________________________________________________________________    REVIEW OF SYSTEMS:       A minimum of an eleven point review of systems was completed. Review Of Systems (11 point):  Constitutional: No fever, chills or malaise; No weight change or fatigue  Head and Eyes: No vision, Headache, Dizziness or trauma in last 12 months  ENT ROS: No hearing, Tinnitis, sinus or taste problems  Hematological and Lymphatic ROS:No Lymphoma, Von Willebrand's, Hemophillia or Bleeding History  Psych ROS: No Depression, Homicidal thoughts,suicidal thoughts, or anxiety  Breast ROS: No prior breast abnormalities or lumps  Respiratory ROS: No SOB, Pneumoniae,Cough, or Pulmonary Embolism History  Cardiovascular ROS: No Chest Pain with Exertion, Palpitations, Syncope, Edema, Arrhythmia  Gastrointestinal ROS: No Indigestion, Heartburn, Nausea, vomiting, Diarrhea, Constipation,or Bowel Changes; No Bloody Stools or melena  Genito-Urinary ROS: No Dysuria, Hematuria or Nocturia.  No Urinary Incontinence or Vaginal Discharge  Musculoskeletal ROS: No Arthralgia, Arthritis,Gout,Osteoporosis or Rheumatism  Neurological ROS: No CVA, Migraines, Epilepsy, Seizure Hx, or Limb Weakness  Dermatological ROS: No Rash, Itching, Hives, Mole Changes or Cancer                                                                                                                                                                                                                                  PHYSICAL Exam:     Constitutional:  Vitals:    06/10/19 1117   BP: 110/62   Site: Right Upper Arm   Position: Sitting   Cuff Size: Medium Adult   Weight: 144 lb (65.3 kg)         General Appearance: This  is a well Developed, well Nourished, well groomed female. Her BMI was reviewed. Nutritional decision making was discussed. Skin:  There was a Normal Inspection of the skin without rashes or lesions. There were no rashes. Lymphatic:  No Lymph Nodes were Palpable in the neck , axilla or groin.  0 # Of Lymph Nodes  Neck and EENT:  The neck was supple. There were no masses   The thyroid was not enlarged and had no masses. EOMI B/L    Respiratory: The lungs were auscultated and found to be clear. There were no rales, rhonchi or wheezes. There was a good respiratory effort. Cardiovascular: The heart was in a regular rate and rhythm. . No S3 or S4. There was no murmur appreciated. Location, grade, and radiation are not applicable. Extremities: The patients extremities were without calf tenderness, edema, or varicosities. There was full range of motion in all four extremities. Pulses in all four extremities were appreciated and are 2/4. Abdomen: The abdomen was soft and non-tender. There were good bowel sounds in all quadrants and there was no guarding, rebound or rigidity. Abdominal Scars: none    Psych: The patient had a normal Orientation to: Time, Place, Person, and Situation  There is no Mood / Affect changes    Breast:  (Chest)  normal appearance, no masses or tenderness  Self breast exams were reviewed in detail. Literature was given.     Pelvic Exam:  Vulva and vagina appear normal. Bimanual exam reveals normal uterus and adnexa. Rectal Exam:  exam declined by patient          Musculosk:  Normal Gait and station was noted. Digits were evaluated without abnormal findings. Range of motion, stability and strength were evaluated and found to be appropriate for the patients age. OMM Structural Component:  The patient did not complain of a Chief complaint requiring OMM. Chief Complaint:none    Structural Exam: Refused      ASSESSMENT:      29 y.o. Annual   Diagnosis Orders   1. Visit for gynecologic examination  PAP SMEAR   2. Depo-Provera contraceptive status  NM INJECTION,THERAP/PROPH/DIAGNOST, IM OR SUBCUT    medroxyPROGESTERone (DEPO-PROVERA) injection 150 mg          Chief Complaint   Patient presents with    Gynecologic Exam    Injections     depo injection           Past Medical History:   Diagnosis Date    Rheumatoid arthritis ([address]     no medications    SVT (supraventricular tachycardia) ([address] 7/1/2014         Patient Active Problem List    Diagnosis Date Noted    Vulvar skin tag 08/14/2018     Left side. Does not want removed or biopsied      Dysplasia of cervix, high grade SHEYLA 2 07/31/2018     LEEP 7/31/18  7/31/18 LEEP with top hat, Depo-Provera injection in L gluteal muscle 07/31/2018    Hx of IUD use 05/01/2017     Paraguard removed 5/10/18      SVT (supraventricular tachycardia) ([address] 07/02/2014     s/p ablation       Dysautonomia orthostatic hypotension syndrome ([address] 07/02/2014    Arthritis 07/02/2014          Hereditary Breast, Ovarian, Colon and Uterine Cancer screening Done. Tobacco & Secondary smoke risks reviewed; instructed on cessation and avoidance      Counseling Completed:  Preventative Health Recommendations and Follow up. Counseling Hormonal Based Birth Control:      The patient was seen and counseled on all forms of birth control both male and female  reversible and non.  She is aware that hormonal based birth control may increase her risk of developing a blood clot which may increase her morbidity and or mortality. She was counseled on alternate non hormonal based contraception options. We discussed that smoking and any hormonal based contraception may increase the patients risks of developing these life threatening blood clots. All patients are encouraged to stop smoking at the time of contraceptive counseling. Cessation programs were reviewed. The patient was instructed to use barrier contraception for sexually transmitted disease prevention. The patient was also informed of antibiotics decreasing contraceptive efficacy and the need for barrier contraception from the onset of her antibiotic dosing and through a minimum of thirty days from antibiotic cessation. The life threatening side effect profile was reviewed in detail this includes but is not limited to shortness of breath, chest pain, severe or persistent headaches, or calf pain. If any of these occur the patient has been instructed to stop using her hormonal based contraception, notify the office, and go to the emergency department or call 911. The patient denied any personal history of blood clots in her leg, lung, or heart and denied any family history of stroke, TIA, sudden cardiac death < 36 y.o.,pulmonary embolism, or deep venous thrombosis. PLAN:  Return in about 1 year (around 6/10/2020) for annual.   Depo Provera   Repeat Annual every 1 year  Cervical Cytology Evaluation begins at 24years old. If Negative Cytology, Follow-up screening per current guidelines. Birth control and barrier recommendations discussed. STD counseling and prevention reviewed. Routine health maintenance per patients PCP. Orders Placed This Encounter   Procedures    PAP SMEAR     Patient History:    No LMP recorded. Patient has had an injection.   OBGYN Status: Injection  Past Surgical History:  2008: ABLATION OF DYSRHYTHMIC FOCUS      Comment: cryoablation svt  07/09/2018: COLPOSCOPY      Comment:  LGSIL  03/2017: INTRAUTERINE DEVICE INSERTION      Comment:  Paraguard, later removed  7/31/2018: ND COLPOSCOPY,CERVIX W/ADJ VAG,W/LOOP BX; N/A      Comment:  LEEP W/TOP HAT & COLPOSCOPY W/DEPO performed by Clive Marie DO at [address]  Medications/Contraceptives Affecting Cytology     Progestin Contraceptives - Injectable Disp Start End     medroxyPROGESTERone (DEPO-PROVERA) 150 MG/ML injection    1 mL 3/21/2019     Sig: Inject 1 mL into the muscle every 3 months    Route: Intramuscular     Problem List       Edg Problems Affecting Cytology    Dysplasia of cervix, high grade SHEYLA 2    Overview Signed 7/31/2018  8:41 AM by Amanda Manzo DO     LEEP 7/31/18        Social History    Tobacco Use      Smoking status: Current Every Day Smoker        Packs/day: 0.50        Types: Cigarettes      Smokeless tobacco: Never Used       Standing Status:   Future     Standing Expiration Date:   6/10/2020     Order Specific Question:   Collection Type     Answer: Thin Prep     Order Specific Question:   Prior Abnormal Pap Test     Answer:   No     Order Specific Question:   Screening or Diagnostic     Answer:   Screening     Order Specific Question:   HPV Requested?      Answer:   Yes -  If ASCUS Reflex HPV     Order Specific Question:   High Risk Patient     Answer:   N/A    ND INJECTION,THERAP/PROPH/DIAGNOST, IM OR SUBCUT

## 2019-06-10 NOTE — PROGRESS NOTES
Jono de la paz is here for Depo Provera 150 mg. Given Intramuscular, Right upper quad. gluteus. Lot Number: [id number]  EXP: 10/2020  Diagnosis: contraception use   Patient tolerated well and is to return to office in 12 weeks.

## 2019-06-14 LAB — CYTOLOGY REPORT: NORMAL

## 2019-06-19 ENCOUNTER — TELEPHONE (OUTPATIENT)
Dept: OBGYN CLINIC | Age: 29
End: 2019-06-19

## 2019-06-19 DIAGNOSIS — B96.89 BACTERIAL VAGINOSIS: Primary | ICD-10-CM

## 2019-06-19 DIAGNOSIS — N76.0 BACTERIAL VAGINOSIS: Primary | ICD-10-CM

## 2019-06-19 RX ORDER — METRONIDAZOLE 500 MG/1
500 TABLET ORAL 2 TIMES DAILY
Qty: 14 TABLET | Refills: 0 | OUTPATIENT
Start: 2019-06-19 | End: 2019-06-26

## 2019-06-19 NOTE — TELEPHONE ENCOUNTER
Called patient. Informed her of her pap results, also informed her we would be sending flagyl to her pharmacy. I will call flagyl in.

## 2019-06-19 NOTE — TELEPHONE ENCOUNTER
----- Message from Bob Graves, DO sent at 6/14/2019  2:11 PM EDT -----  Please call and notify patient of +BV on pap smear. Normal pap smear, but should treat with Flagyl 500mg BID PO x7days for her BV. Thank you.

## 2019-08-28 ENCOUNTER — NURSE ONLY (OUTPATIENT)
Dept: OBGYN CLINIC | Age: 29
End: 2019-08-28
Payer: MEDICAID

## 2019-08-28 DIAGNOSIS — N92.1 IRREGULAR INTERMENSTRUAL BLEEDING: ICD-10-CM

## 2019-08-28 DIAGNOSIS — Z30.42 DEPO-PROVERA CONTRACEPTIVE STATUS: Primary | ICD-10-CM

## 2019-08-28 PROCEDURE — 96372 THER/PROPH/DIAG INJ SC/IM: CPT | Performed by: OBSTETRICS & GYNECOLOGY

## 2019-08-28 PROCEDURE — 96372 THER/PROPH/DIAG INJ SC/IM: CPT | Performed by: NURSE PRACTITIONER

## 2019-08-28 RX ORDER — MEDROXYPROGESTERONE ACETATE 150 MG/ML
150 INJECTION, SUSPENSION INTRAMUSCULAR ONCE
Status: COMPLETED | OUTPATIENT
Start: 2019-08-28 | End: 2019-08-28

## 2019-08-28 RX ADMIN — MEDROXYPROGESTERONE ACETATE 150 MG: 150 INJECTION, SUSPENSION INTRAMUSCULAR at 10:05

## 2019-11-19 ENCOUNTER — NURSE ONLY (OUTPATIENT)
Dept: OBGYN CLINIC | Age: 29
End: 2019-11-19

## 2019-11-19 DIAGNOSIS — Z30.42 SURVEILLANCE FOR DEPO-PROVERA CONTRACEPTION: Primary | ICD-10-CM

## 2019-11-19 DIAGNOSIS — N92.1 IRREGULAR INTERMENSTRUAL BLEEDING: ICD-10-CM

## 2019-11-19 RX ORDER — MEDROXYPROGESTERONE ACETATE 150 MG/ML
150 INJECTION, SUSPENSION INTRAMUSCULAR ONCE
Status: COMPLETED | OUTPATIENT
Start: 2019-11-19 | End: 2019-11-19

## 2019-11-19 RX ADMIN — MEDROXYPROGESTERONE ACETATE 150 MG: 150 INJECTION, SUSPENSION INTRAMUSCULAR at 09:44

## 2019-12-20 ENCOUNTER — OFFICE VISIT (OUTPATIENT)
Dept: PRIMARY CARE CLINIC | Age: 29
End: 2019-12-20
Payer: MEDICAID

## 2019-12-20 VITALS
HEART RATE: 89 BPM | SYSTOLIC BLOOD PRESSURE: 122 MMHG | DIASTOLIC BLOOD PRESSURE: 82 MMHG | HEIGHT: 64 IN | WEIGHT: 159.6 LBS | OXYGEN SATURATION: 95 % | RESPIRATION RATE: 15 BRPM | BODY MASS INDEX: 27.25 KG/M2

## 2019-12-20 DIAGNOSIS — I95.1 DYSAUTONOMIA ORTHOSTATIC HYPOTENSION SYNDROME: ICD-10-CM

## 2019-12-20 DIAGNOSIS — Z98.890 STATUS POST CRYOABLATION: ICD-10-CM

## 2019-12-20 DIAGNOSIS — Z98.890 S/P LEEP (LOOP ELECTROSURGICAL EXCISION PROCEDURE): ICD-10-CM

## 2019-12-20 DIAGNOSIS — I47.1 SVT (SUPRAVENTRICULAR TACHYCARDIA) (HCC): ICD-10-CM

## 2019-12-20 DIAGNOSIS — Z23 NEED FOR INFLUENZA VACCINATION: ICD-10-CM

## 2019-12-20 DIAGNOSIS — Z76.89 ENCOUNTER TO ESTABLISH CARE WITH NEW DOCTOR: Primary | ICD-10-CM

## 2019-12-20 DIAGNOSIS — F33.1 MODERATE EPISODE OF RECURRENT MAJOR DEPRESSIVE DISORDER (HCC): ICD-10-CM

## 2019-12-20 DIAGNOSIS — M06.9 RHEUMATOID ARTHRITIS, INVOLVING UNSPECIFIED SITE, UNSPECIFIED RHEUMATOID FACTOR PRESENCE: ICD-10-CM

## 2019-12-20 PROCEDURE — G8427 DOCREV CUR MEDS BY ELIG CLIN: HCPCS | Performed by: INTERNAL MEDICINE

## 2019-12-20 PROCEDURE — G8419 CALC BMI OUT NRM PARAM NOF/U: HCPCS | Performed by: INTERNAL MEDICINE

## 2019-12-20 PROCEDURE — 90686 IIV4 VACC NO PRSV 0.5 ML IM: CPT | Performed by: INTERNAL MEDICINE

## 2019-12-20 PROCEDURE — 90471 IMMUNIZATION ADMIN: CPT | Performed by: INTERNAL MEDICINE

## 2019-12-20 PROCEDURE — 4004F PT TOBACCO SCREEN RCVD TLK: CPT | Performed by: INTERNAL MEDICINE

## 2019-12-20 PROCEDURE — 96160 PT-FOCUSED HLTH RISK ASSMT: CPT | Performed by: INTERNAL MEDICINE

## 2019-12-20 PROCEDURE — 99214 OFFICE O/P EST MOD 30 MIN: CPT | Performed by: INTERNAL MEDICINE

## 2019-12-20 PROCEDURE — G8482 FLU IMMUNIZE ORDER/ADMIN: HCPCS | Performed by: INTERNAL MEDICINE

## 2019-12-20 RX ORDER — FLUOXETINE HYDROCHLORIDE 20 MG/1
20 CAPSULE ORAL DAILY
Qty: 90 CAPSULE | Refills: 1 | Status: SHIPPED | OUTPATIENT
Start: 2019-12-20 | End: 2020-06-11 | Stop reason: SDUPTHER

## 2019-12-20 ASSESSMENT — ENCOUNTER SYMPTOMS
ABDOMINAL DISTENTION: 0
SHORTNESS OF BREATH: 0
DIARRHEA: 0
COUGH: 0
NAUSEA: 0
SINUS PRESSURE: 0
SINUS PAIN: 0
CONSTIPATION: 0
BACK PAIN: 0
WHEEZING: 0
VOMITING: 0
ABDOMINAL PAIN: 0

## 2019-12-20 ASSESSMENT — PATIENT HEALTH QUESTIONNAIRE - PHQ9
10. IF YOU CHECKED OFF ANY PROBLEMS, HOW DIFFICULT HAVE THESE PROBLEMS MADE IT FOR YOU TO DO YOUR WORK, TAKE CARE OF THINGS AT HOME, OR GET ALONG WITH OTHER PEOPLE: 1
1. LITTLE INTEREST OR PLEASURE IN DOING THINGS: 2
6. FEELING BAD ABOUT YOURSELF - OR THAT YOU ARE A FAILURE OR HAVE LET YOURSELF OR YOUR FAMILY DOWN: 1
7. TROUBLE CONCENTRATING ON THINGS, SUCH AS READING THE NEWSPAPER OR WATCHING TELEVISION: 0
8. MOVING OR SPEAKING SO SLOWLY THAT OTHER PEOPLE COULD HAVE NOTICED. OR THE OPPOSITE, BEING SO FIGETY OR RESTLESS THAT YOU HAVE BEEN MOVING AROUND A LOT MORE THAN USUAL: 0
2. FEELING DOWN, DEPRESSED OR HOPELESS: 2
SUM OF ALL RESPONSES TO PHQ9 QUESTIONS 1 & 2: 4
SUM OF ALL RESPONSES TO PHQ QUESTIONS 1-9: 11
SUM OF ALL RESPONSES TO PHQ QUESTIONS 1-9: 11
3. TROUBLE FALLING OR STAYING ASLEEP: 3
5. POOR APPETITE OR OVEREATING: 0
4. FEELING TIRED OR HAVING LITTLE ENERGY: 3
9. THOUGHTS THAT YOU WOULD BE BETTER OFF DEAD, OR OF HURTING YOURSELF: 0

## 2020-02-07 RX ORDER — MEDROXYPROGESTERONE ACETATE 150 MG/ML
150 INJECTION, SUSPENSION INTRAMUSCULAR
Qty: 1 ML | Refills: 0 | Status: CANCELLED | OUTPATIENT
Start: 2020-02-07

## 2020-02-10 ENCOUNTER — NURSE ONLY (OUTPATIENT)
Dept: OBGYN CLINIC | Age: 30
End: 2020-02-10
Payer: MEDICAID

## 2020-02-10 PROCEDURE — 96372 THER/PROPH/DIAG INJ SC/IM: CPT | Performed by: NURSE PRACTITIONER

## 2020-02-10 RX ORDER — MEDROXYPROGESTERONE ACETATE 150 MG/ML
150 INJECTION, SUSPENSION INTRAMUSCULAR ONCE
Status: COMPLETED | OUTPATIENT
Start: 2020-02-10 | End: 2020-02-10

## 2020-02-10 RX ORDER — MEDROXYPROGESTERONE ACETATE 150 MG/ML
150 INJECTION, SUSPENSION INTRAMUSCULAR
Qty: 1 ML | Refills: 3 | Status: SHIPPED | OUTPATIENT
Start: 2020-02-10 | End: 2021-08-23

## 2020-02-10 RX ADMIN — MEDROXYPROGESTERONE ACETATE 150 MG: 150 INJECTION, SUSPENSION INTRAMUSCULAR at 10:29

## 2020-02-10 NOTE — PROGRESS NOTES
Ye Knapp is here for Depo Provera 150 mg. Given Intramuscular, Left upper quad. gluteus. Lot Number: [id number]  EXP: 8/2021  Diagnosis: irregular periods   Patient tolerated well and is to return to office in 12 weeks.

## 2020-05-04 ENCOUNTER — NURSE ONLY (OUTPATIENT)
Dept: OBGYN CLINIC | Age: 30
End: 2020-05-04
Payer: MEDICAID

## 2020-05-04 PROCEDURE — 96372 THER/PROPH/DIAG INJ SC/IM: CPT | Performed by: OBSTETRICS & GYNECOLOGY

## 2020-05-04 RX ORDER — MEDROXYPROGESTERONE ACETATE 150 MG/ML
150 INJECTION, SUSPENSION INTRAMUSCULAR ONCE
Status: COMPLETED | OUTPATIENT
Start: 2020-05-04 | End: 2020-05-04

## 2020-05-04 RX ADMIN — MEDROXYPROGESTERONE ACETATE 150 MG: 150 INJECTION, SUSPENSION INTRAMUSCULAR at 10:00

## 2020-06-11 RX ORDER — FLUOXETINE HYDROCHLORIDE 20 MG/1
20 CAPSULE ORAL DAILY
Qty: 90 CAPSULE | Refills: 1 | Status: SHIPPED | OUTPATIENT
Start: 2020-06-11 | End: 2020-11-19 | Stop reason: SDUPTHER

## 2020-06-11 NOTE — TELEPHONE ENCOUNTER
Patient called in and is requesting her medication be refilled at [facility] on [address] [facility]  FX# [id number]. Patient can be reached at [phone].  Last OV 12/20/19  Next VV 6/24/2020

## 2020-06-24 ENCOUNTER — TELEMEDICINE (OUTPATIENT)
Dept: PRIMARY CARE CLINIC | Age: 30
End: 2020-06-24
Payer: MEDICAID

## 2020-06-24 PROCEDURE — G8427 DOCREV CUR MEDS BY ELIG CLIN: HCPCS | Performed by: INTERNAL MEDICINE

## 2020-06-24 PROCEDURE — 99213 OFFICE O/P EST LOW 20 MIN: CPT | Performed by: INTERNAL MEDICINE

## 2020-06-28 ASSESSMENT — ENCOUNTER SYMPTOMS
SHORTNESS OF BREATH: 0
CONSTIPATION: 0
NAUSEA: 0
COUGH: 0
DIARRHEA: 0
ABDOMINAL PAIN: 0
SINUS PRESSURE: 0
SINUS PAIN: 0
VOMITING: 0
BACK PAIN: 0
ABDOMINAL DISTENTION: 0
WHEEZING: 0

## 2020-06-28 NOTE — PROGRESS NOTES
All other systems reviewed and are negative. Objective:     Physical Exam  Vitals signs and nursing note reviewed. Constitutional:       General: She is not in acute distress. Appearance: Normal appearance. HENT:      Head: Normocephalic and atraumatic. Nose: Nose normal.   Eyes:      General: No scleral icterus. Conjunctiva/sclera: Conjunctivae normal.      Pupils: Pupils are equal, round, and reactive to light. Neck:      Musculoskeletal: Normal range of motion. No neck rigidity. Pulmonary:      Effort: Pulmonary effort is normal. No respiratory distress. Chest:      Chest wall: No tenderness. Abdominal:      General: There is no distension. Musculoskeletal: Normal range of motion. General: No swelling or deformity. Skin:     Coloration: Skin is not jaundiced or pale. Findings: No erythema or rash. Neurological:      General: No focal deficit present. Mental Status: She is alert and oriented to person, place, and time. Mental status is at baseline. Psychiatric:         Mood and Affect: Mood normal.         Behavior: Behavior normal.         Thought Content: Thought content normal.       There were no vitals taken for this visit. Assessment:          1. Anxiety and depression  prozac            Diagnosis Orders   1. Anxiety and depression             Plan:      Return in about 2 months (around 8/24/2020). No orders of the defined types were placed in this encounter. No orders of the defined types were placed in this encounter. Patient given educational materials - see patient instructions. Discussed use, benefit, and side effects of prescribedmedications. All patient questions answered. Pt voiced understanding. Reviewed health maintenance. Instructed to continue current medications, diet and exercise. Patient agreed with treatment plan. Follow up as directed. I spent a total of 15 minutes face to face virtually with this patient.   Over

## 2020-07-22 ENCOUNTER — HOSPITAL ENCOUNTER (OUTPATIENT)
Age: 30
Setting detail: SPECIMEN
Discharge: HOME | End: 2020-07-22
Payer: MEDICAID

## 2020-07-22 ENCOUNTER — OFFICE VISIT (OUTPATIENT)
Dept: OBGYN CLINIC | Age: 30
End: 2020-07-22
Payer: MEDICAID

## 2020-07-22 VITALS
BODY MASS INDEX: 25.06 KG/M2 | DIASTOLIC BLOOD PRESSURE: 68 MMHG | WEIGHT: 146 LBS | TEMPERATURE: 99.4 F | SYSTOLIC BLOOD PRESSURE: 120 MMHG

## 2020-07-22 PROCEDURE — 99395 PREV VISIT EST AGE 18-39: CPT | Performed by: OBSTETRICS & GYNECOLOGY

## 2020-07-22 RX ORDER — MEDROXYPROGESTERONE ACETATE 150 MG/ML
150 INJECTION, SUSPENSION INTRAMUSCULAR ONCE
Status: COMPLETED | OUTPATIENT
Start: 2020-07-22 | End: 2020-07-22

## 2020-07-22 RX ORDER — MEDROXYPROGESTERONE ACETATE 150 MG/ML
150 INJECTION, SUSPENSION INTRAMUSCULAR ONCE
Qty: 1 ML | Refills: 3 | Status: SHIPPED | OUTPATIENT
Start: 2020-07-22 | End: 2021-07-29

## 2020-07-22 RX ADMIN — MEDROXYPROGESTERONE ACETATE 150 MG: 150 INJECTION, SUSPENSION INTRAMUSCULAR at 12:58

## 2020-07-22 NOTE — PROGRESS NOTES
History and Physical    Pipo WELLS  2020              34 y.o. Chief Complaint   Patient presents with    Gynecologic Exam    Injections     depo       No LMP recorded. Patient has had an injection. Primary Care Physician: Ann Hernández MD    The patient was seen and examined. She has no chief complaint today and is here for her annual exam.  Her bowels are regular. There are no voiding complaints. She denies any bloating. She denies vaginal discharge and was counseled on STD's and the need for barrier contraception. HPI : Dietra Fothergill is a 34 y.o. female     She is feeling well. She has been happy on Depo Provera.   No complaints today, declining cultures    ________________________________________________________________________  OB History    Para Term  AB Living   2 1 1 0 1 1   SAB TAB Ectopic Molar Multiple Live Births   1 0 0 0 0 1      # Outcome Date GA Lbr Ayaan/2nd Weight Sex Delivery Anes PTL Lv   2 Term 2017 39w0d  6 lb 1 oz (2.75 kg) M Vag-Spont  N CESAR   1 SAB              Past Medical History:   Diagnosis Date    [facility] arthritis [facility] [address]     no medications    SVT (supraventricular tachycardia) ([facility] [address] 2014                                                                   Past Surgical History:   Procedure Laterality Date    ABLATION OF DYSRHYTHMIC FOCUS      cryoablation svt    COLPOSCOPY  2018    LGSIL    INTRAUTERINE DEVICE INSERTION  2017    Paraguard, later removed    [place] COLPOSCOPY,CERVIX W/ADJ VAG,W/LOOP BX N/A 2018    LEEP W/TOP HAT & COLPOSCOPY W/DEPO performed by Renaldo Hinkle DO at [facility] History   Problem Relation Age of Onset    High Blood Pressure Father     COPD Maternal Grandfather     Cancer Paternal Grandmother         bone     Social History     Socioeconomic History    Marital status:      Spouse name: Not on file    Number of children: Not on file    Years of education: Not on file    Highest education level: Not on file   Occupational History    Not on file   Social Needs    Financial resource strain: Not on file    Food insecurity     Worry: Not on file     Inability: Not on file    Transportation needs     Medical: Not on file     Non-medical: Not on file   Tobacco Use    Smoking status: Current Every Day Smoker     Packs/day: 0.50     Types: Cigarettes    Smokeless tobacco: Never Used   Substance and Sexual Activity    Alcohol use: No    Drug use: No    Sexual activity: Yes     Partners: Male   Lifestyle    Physical activity     Days per week: Not on file     Minutes per session: Not on file    Stress: Not on file   Relationships    Social connections     Talks on phone: Not on file     Gets together: Not on file     Attends Jainism service: Not on file     Active member of club or organization: Not on file     Attends meetings of clubs or organizations: Not on file     Relationship status: Not on file    Intimate partner violence     Fear of current or ex partner: Not on file     Emotionally abused: Not on file     Physically abused: Not on file     Forced sexual activity: Not on file   Other Topics Concern    Not on file   Social History Narrative    Not on file       MEDICATIONS:  Current Outpatient Medications   Medication Sig Dispense Refill    medroxyPROGESTERone (DEPO-PROVERA) 150 MG/ML injection Inject 1 mL into the muscle once for 1 dose 1 mL 3    FLUoxetine (PROZAC) 20 MG capsule Take 1 capsule by mouth daily 90 capsule 1    medroxyPROGESTERone (DEPO-PROVERA) 150 MG/ML injection Inject 1 mL into the muscle every 3 months 1 mL 3    Biotin 10 MG tablet Take 10 mg by mouth daily       No current facility-administered medications for this visit. ALLERGIES:  Allergies as of 07/22/2020    (No Known Allergies)       Symptoms of decreased mood absent      Immunization status: stated as current, but no records available.       Gynecologic ROS: No CVA, Migraines, Epilepsy, Seizure Hx, or Limb Weakness  Dermatological ROS: No Rash, Itching, Hives, Mole Changes or Cancer                                                                                                                                                                                                                                  PHYSICAL Exam:     Constitutional:  Vitals:    07/22/20 1248   BP: 120/68   Site: Right Upper Arm   Position: Sitting   Cuff Size: Small Adult   Temp: 99.4 °F (37.4 °C)   Weight: 146 lb (66.2 kg)         General Appearance: This  is a well Developed, well Nourished, well groomed female. Her BMI was reviewed. Nutritional decision making was discussed. Skin:  There was a Normal Inspection of the skin without rashes or lesions. There were no rashes. Lymphatic:  No Lymph Nodes were Palpable in the neck , axilla or groin.  0 # Of Lymph Nodes  Neck and EENT:  The neck was supple. There were no masses   The thyroid was not enlarged and had no masses. EOMI B/L    Respiratory: The lungs were auscultated and found to be clear. There were no rales, rhonchi or wheezes. There was a good respiratory effort. Cardiovascular: The heart was in a regular rate and rhythm. . No S3 or S4. There was no murmur appreciated. Location, grade, and radiation are not applicable. Extremities: The patients extremities were without calf tenderness, edema, or varicosities. There was full range of motion in all four extremities. Pulses in all four extremities were appreciated and are 2/4. Abdomen: The abdomen was soft and non-tender. There were good bowel sounds in all quadrants and there was no guarding, rebound or rigidity. Abdominal Scars: none    Psych:   The patient had a normal Orientation to: Time, Place, Person, and Situation  There is no Mood / Affect changes    Breast:  (Chest)  normal appearance, no masses or tenderness  Self breast exams were reviewed in detail. Literature was given. Pelvic Exam:  Vulva and vagina appear normal. Bimanual exam reveals normal uterus and adnexa. Rectal Exam:  exam declined by patient          Musculosk:  Normal Gait and station was noted. Digits were evaluated without abnormal findings. Range of motion, stability and strength were evaluated and found to be appropriate for the patients age. OMM Structural Component:  The patient did not complain of a Chief complaint requiring OMM. Chief Complaint:none    Structural Exam: Refused      ASSESSMENT:      34 y.o. Annual   Diagnosis Orders   1. Encounter for annual routine gynecological examination  PAP SMEAR   2. Irregular intermenstrual bleeding  MI INJECTION,THERAP/PROPH/DIAGNOST, IM OR SUBCUT    medroxyPROGESTERone (DEPO-PROVERA) injection 150 mg          Chief Complaint   Patient presents with    Gynecologic Exam    Injections     depo          Past Medical History:   Diagnosis Date    Rheumatoid arthritis ([address]     no medications    SVT (supraventricular tachycardia) ([address] 7/1/2014         Patient Active Problem List    Diagnosis Date Noted    Status post cryoablation 12/20/2019    Vulvar skin tag 08/14/2018     Left side. Does not want removed or biopsied      Dysplasia of cervix, high grade SHEYLA 2 07/31/2018     LEEP 7/31/18  7/31/18 LEEP with top hat, Depo-Provera injection in L gluteal muscle 07/31/2018    Hx of IUD use 05/01/2017     Paraguard removed 5/10/18      SVT (supraventricular tachycardia) ([address] 07/02/2014     s/p ablation       Dysautonomia orthostatic hypotension syndrome ([address] 07/02/2014    Rheumatoid arthritis ([address] 07/02/2014          Hereditary Breast, Ovarian, Colon and Uterine Cancer screening Done. Tobacco & Secondary smoke risks reviewed; instructed on cessation and avoidance      Counseling Completed:  Preventative Health Recommendations and Follow up.   Counseling Hormonal Based Birth Control:      The patient was seen and counseled on all forms of birth control both male and female  reversible and non. She is aware that hormonal based birth control may increase her risk of developing a blood clot which may increase her morbidity and or mortality. She was counseled on alternate non hormonal based contraception options. We discussed that smoking and any hormonal based contraception may increase the patients risks of developing these life threatening blood clots. All patients are encouraged to stop smoking at the time of contraceptive counseling. Cessation programs were reviewed. The patient was instructed to use barrier contraception for sexually transmitted disease prevention. The patient was also informed of antibiotics decreasing contraceptive efficacy and the need for barrier contraception from the onset of her antibiotic dosing and through a minimum of thirty days from antibiotic cessation. The life threatening side effect profile was reviewed in detail this includes but is not limited to shortness of breath, chest pain, severe or persistent headaches, or calf pain. If any of these occur the patient has been instructed to stop using her hormonal based contraception, notify the office, and go to the emergency department or call 911. The patient denied any personal history of blood clots in her leg, lung, or heart and denied any family history of stroke, TIA, sudden cardiac death < 36 y.o.,pulmonary embolism, or deep venous thrombosis. PLAN:  Return in about 1 year (around 7/22/2021) for annual.   Depo Provera   Repeat Annual every 1 year  Cervical Cytology Evaluation begins at 24years old. If Negative Cytology, Follow-up screening per current guidelines. Birth control and barrier recommendations discussed. STD counseling and prevention reviewed. Routine health maintenance per patients PCP. Orders Placed This Encounter   Procedures    PAP SMEAR     Patient History:    No LMP recorded.  Patient has had an injection. OBGYN Status: Injection  Past Surgical History:  2008: ABLATION OF DYSRHYTHMIC FOCUS      Comment:  cryoablation svt  07/09/2018: COLPOSCOPY      Comment:  LGSIL  03/2017: INTRAUTERINE DEVICE INSERTION      Comment:  Laura, later removed  7/31/2018: [place] COLPOSCOPY,CERVIX W/ADJ VAG,W/LOOP BX; N/A      Comment:  LEEP W/TOP HAT & COLPOSCOPY W/DEPO performed by Jazzy Bowie DO at [address]  Medications/Contraceptives Affecting Cytology     Progestin Contraceptives - Injectable Disp Start End     medroxyPROGESTERone (DEPO-PROVERA) 150 MG/ML injection    1 mL 2/10/2020     Sig: Inject 1 mL into the muscle every 3 months    Route: Intramuscular     Problem List       Edg Problems Affecting Cytology    Dysplasia of cervix, high grade SHEYLA 2    Overview Signed 7/31/2018  8:41 AM by Renetta Longoria DO     LEEP 7/31/18        Social History    Tobacco Use      Smoking status: Current Every Day Smoker        Packs/day: 0.50        Types: Cigarettes      Smokeless tobacco: Never Used       Standing Status:   Future     Standing Expiration Date:   7/23/2021     Order Specific Question:   Collection Type     Answer: Thin Prep     Order Specific Question:   Prior Abnormal Pap Test     Answer:   No     Order Specific Question:   Screening or Diagnostic     Answer:   Screening     Order Specific Question:   HPV Requested?      Answer:   Yes - If Abnormal Reflex HPV     Order Specific Question:   High Risk Patient     Answer:   N/A    TN INJECTION,THERAP/PROPH/DIAGNOST, IM OR SUBCUT

## 2020-07-24 LAB — CYTOLOGY REPORT: NORMAL

## 2020-09-04 ENCOUNTER — OFFICE VISIT (OUTPATIENT)
Dept: PRIMARY CARE CLINIC | Age: 30
End: 2020-09-04
Payer: MEDICAID

## 2020-09-04 ENCOUNTER — HOSPITAL ENCOUNTER (OUTPATIENT)
Age: 30
Setting detail: SPECIMEN
Discharge: HOME | End: 2020-09-04
Payer: MEDICAID

## 2020-09-04 VITALS
SYSTOLIC BLOOD PRESSURE: 126 MMHG | DIASTOLIC BLOOD PRESSURE: 72 MMHG | BODY MASS INDEX: 25.37 KG/M2 | OXYGEN SATURATION: 100 % | TEMPERATURE: 97.5 F | WEIGHT: 148.6 LBS | HEART RATE: 83 BPM | RESPIRATION RATE: 20 BRPM | HEIGHT: 64 IN

## 2020-09-04 PROBLEM — F33.1 MODERATE EPISODE OF RECURRENT MAJOR DEPRESSIVE DISORDER (HCC): Status: ACTIVE | Noted: 2020-09-04

## 2020-09-04 LAB
ABSOLUTE EOS #: 0.46 K/UL (ref 0–0.44)
ABSOLUTE IMMATURE GRANULOCYTE: 0.04 K/UL (ref 0–0.3)
ABSOLUTE LYMPH #: 3.89 K/UL (ref 1.1–3.7)
ABSOLUTE MONO #: 0.53 K/UL (ref 0.1–1.2)
ALBUMIN SERPL-MCNC: 4.5 G/DL (ref 3.5–5.2)
ALBUMIN/GLOBULIN RATIO: 1.7 (ref 1–2.5)
ALP BLD-CCNC: 56 U/L (ref 35–104)
ALT SERPL-CCNC: 12 U/L (ref 5–33)
ANION GAP SERPL CALCULATED.3IONS-SCNC: 12 MMOL/L (ref 9–17)
AST SERPL-CCNC: 16 U/L
BASOPHILS # BLD: 1 % (ref 0–2)
BASOPHILS ABSOLUTE: 0.1 K/UL (ref 0–0.2)
BILIRUB SERPL-MCNC: 0.29 MG/DL (ref 0.3–1.2)
BUN BLDV-MCNC: 10 MG/DL (ref 6–20)
BUN/CREAT BLD: ABNORMAL (ref 9–20)
CALCIUM SERPL-MCNC: 9.6 MG/DL (ref 8.6–10.4)
CHLORIDE BLD-SCNC: 106 MMOL/L (ref 98–107)
CHOLESTEROL/HDL RATIO: 3.7
CHOLESTEROL: 157 MG/DL
CO2: 20 MMOL/L (ref 20–31)
CREAT SERPL-MCNC: 0.7 MG/DL (ref 0.5–0.9)
DIFFERENTIAL TYPE: ABNORMAL
EOSINOPHILS RELATIVE PERCENT: 5 % (ref 1–4)
ESTIMATED AVERAGE GLUCOSE: 114 MG/DL
FOLATE: 11.4 NG/ML
GFR AFRICAN AMERICAN: >60 ML/MIN
GFR NON-AFRICAN AMERICAN: >60 ML/MIN
GFR SERPL CREATININE-BSD FRML MDRD: ABNORMAL ML/MIN/{1.73_M2}
GFR SERPL CREATININE-BSD FRML MDRD: ABNORMAL ML/MIN/{1.73_M2}
GLUCOSE BLD-MCNC: 75 MG/DL (ref 70–99)
HBA1C MFR BLD: 5.6 % (ref 4–6)
HCT VFR BLD CALC: 43.7 % (ref 36.3–47.1)
HDLC SERPL-MCNC: 42 MG/DL
HEMOGLOBIN: 14.5 G/DL (ref 11.9–15.1)
IMMATURE GRANULOCYTES: 0 %
LDL CHOLESTEROL: 89 MG/DL (ref 0–130)
LYMPHOCYTES # BLD: 38 % (ref 24–43)
MCH RBC QN AUTO: 31 PG (ref 25.2–33.5)
MCHC RBC AUTO-ENTMCNC: 33.2 G/DL (ref 28.4–34.8)
MCV RBC AUTO: 93.6 FL (ref 82.6–102.9)
MONOCYTES # BLD: 5 % (ref 3–12)
NRBC AUTOMATED: 0 PER 100 WBC
PDW BLD-RTO: 12.9 % (ref 11.8–14.4)
PLATELET # BLD: 284 K/UL (ref 138–453)
PLATELET ESTIMATE: ABNORMAL
PMV BLD AUTO: 11.1 FL (ref 8.1–13.5)
POTASSIUM SERPL-SCNC: 4.2 MMOL/L (ref 3.7–5.3)
RBC # BLD: 4.67 M/UL (ref 3.95–5.11)
RBC # BLD: ABNORMAL 10*6/UL
SEG NEUTROPHILS: 51 % (ref 36–65)
SEGMENTED NEUTROPHILS ABSOLUTE COUNT: 5.26 K/UL (ref 1.5–8.1)
SODIUM BLD-SCNC: 138 MMOL/L (ref 135–144)
TOTAL PROTEIN: 7.1 G/DL (ref 6.4–8.3)
TRIGL SERPL-MCNC: 128 MG/DL
TSH SERPL DL<=0.05 MIU/L-ACNC: 1.05 MIU/L (ref 0.3–5)
VITAMIN B-12: 591 PG/ML (ref 232–1245)
VITAMIN D 25-HYDROXY: 34.1 NG/ML (ref 30–100)
VLDLC SERPL CALC-MCNC: NORMAL MG/DL (ref 1–30)
WBC # BLD: 10.3 K/UL (ref 3.5–11.3)
WBC # BLD: ABNORMAL 10*3/UL

## 2020-09-04 PROCEDURE — 99395 PREV VISIT EST AGE 18-39: CPT | Performed by: INTERNAL MEDICINE

## 2020-09-07 ASSESSMENT — ENCOUNTER SYMPTOMS
CONSTIPATION: 0
WHEEZING: 0
ABDOMINAL DISTENTION: 0
COUGH: 0
NAUSEA: 0
SINUS PAIN: 0
ABDOMINAL PAIN: 0
SHORTNESS OF BREATH: 0
SINUS PRESSURE: 0
VOMITING: 0
DIARRHEA: 0
BACK PAIN: 0

## 2020-09-07 NOTE — PROGRESS NOTES
[address] PRIMARY CARE  [address] [facility] [address]  [address] [address]  Dept: [phone]  Dept Fax: [phone]    Jackie Lawler is a 34 y.o. female who presents today for her medical conditions/complaints as noted below. Chief Complaint   Patient presents with    Annual Exam       HPI:     This is a 66-year-old female who is here for physical.    She is due for blood work and also she would like to have a cardiology referral for dysautonomia. No other complaints or concerns except for chronic fatigue    Currently blood pressures at 126/72 and pulse at 83.       Hemoglobin A1C (%)   Date Value   2020 5.6             ( goal A1C is < 7)   No results found for: LABMICR  LDL Cholesterol (mg/dL)   Date Value   2020 89       (goal LDL is <100)   AST (U/L)   Date Value   2020 16     ALT (U/L)   Date Value   2020 12     BUN (mg/dL)   Date Value   2020 10     BP Readings from Last 3 Encounters:   20 126/72   20 120/68   19 122/82          (goal 120/80)    Past Medical History:   Diagnosis Date    Rheumatoid arthritis ([facility] [address]     no medications    SVT (supraventricular tachycardia) ([facility] [address] 2014      Past Surgical History:   Procedure Laterality Date    ABLATION OF DYSRHYTHMIC FOCUS      cryoablation svt    COLPOSCOPY  2018    LGSIL    INTRAUTERINE DEVICE INSERTION  2017    Paraguard, later removed    [place] COLPOSCOPY,CERVIX W/ADJ VAG,W/LOOP BX N/A 2018    LEEP W/TOP HAT & COLPOSCOPY W/DEPO performed by Giorgi Angel DO at [address]       Family History   Problem Relation Age of Onset    High Blood Pressure Father     COPD Maternal Grandfather     Cancer Paternal Grandmother         bone       Social History     Tobacco Use    Smoking status: Current Every Day Smoker     Packs/day: 0.50     Types: Cigarettes    Smokeless tobacco: Never Used   Substance Use Topics    Alcohol use: No      Current Outpatient Medications   Medication Sig Dispense Refill    FLUoxetine (PROZAC) 20 MG capsule Take 1 capsule by mouth daily 90 capsule 1    medroxyPROGESTERone (DEPO-PROVERA) 150 MG/ML injection Inject 1 mL into the muscle every 3 months 1 mL 3    medroxyPROGESTERone (DEPO-PROVERA) 150 MG/ML injection Inject 1 mL into the muscle once for 1 dose 1 mL 3     No current facility-administered medications for this visit. No Known Allergies    Health Maintenance   Topic Date Due    Varicella vaccine (1 of 2 - 2-dose childhood series) 10/24/1991    HIV screen  10/24/2005    Flu vaccine (1) 11/04/2020 (Originally 9/1/2020)    Pneumococcal 0-64 years Vaccine (1 of 1 - PPSV23) 09/04/2021 (Originally 10/24/1996)    DTaP/Tdap/Td vaccine (2 - Td) 05/13/2023    Cervical cancer screen  07/22/2023    Hepatitis B vaccine  Completed    Hepatitis A vaccine  Aged Out    Hib vaccine  Aged Out    Meningococcal (ACWY) vaccine  Aged Out       Subjective:      Review of Systems   Constitutional: Negative for activity change, appetite change, chills, fatigue and fever. HENT: Negative for congestion, ear pain, hearing loss, nosebleeds, sinus pressure, sinus pain and sneezing. Eyes: Negative for visual disturbance. Respiratory: Negative for cough, shortness of breath and wheezing. Cardiovascular: Negative for chest pain and palpitations. Gastrointestinal: Negative for abdominal distention, abdominal pain, constipation, diarrhea, nausea and vomiting. Endocrine: Negative for cold intolerance, heat intolerance, polydipsia, polyphagia and polyuria. Genitourinary: Negative for difficulty urinating, menstrual problem, vaginal bleeding and vaginal discharge. Musculoskeletal: Negative for back pain and joint swelling. Skin: Negative for rash. Neurological: Negative for numbness and headaches. Psychiatric/Behavioral: Negative for sleep disturbance. The patient is not nervous/anxious.     All other systems reviewed and are negative. Objective:     Physical Exam  Vitals signs and nursing note reviewed. Constitutional:       General: She is not in acute distress. Appearance: She is well-developed. She is not diaphoretic. HENT:      Head: Normocephalic and atraumatic. Right Ear: Hearing normal.      Left Ear: Hearing normal.      Mouth/Throat:      Pharynx: Uvula midline. Eyes:      General: No scleral icterus. Conjunctiva/sclera: Conjunctivae normal.      Pupils: Pupils are equal, round, and reactive to light. Neck:      Musculoskeletal: Full passive range of motion without pain, normal range of motion and neck supple. Thyroid: No thyroid mass or thyromegaly. Vascular: No JVD. Trachea: Phonation normal.   Cardiovascular:      Rate and Rhythm: Normal rate and regular rhythm. Pulses: No decreased pulses. Carotid pulses are 2+ on the right side and 2+ on the left side. Radial pulses are 2+ on the right side and 2+ on the left side. Heart sounds: Normal heart sounds. No murmur. Pulmonary:      Effort: Pulmonary effort is normal. No accessory muscle usage or respiratory distress. Breath sounds: Normal breath sounds. No wheezing or rales. Abdominal:      General: Bowel sounds are normal. There is no distension. Palpations: Abdomen is soft. Tenderness: There is no abdominal tenderness. Musculoskeletal: Normal range of motion. General: No deformity. Lymphadenopathy:      Cervical: No cervical adenopathy. Skin:     General: Skin is warm. Capillary Refill: Capillary refill takes less than 2 seconds. Findings: No rash. Neurological:      Mental Status: She is alert and oriented to person, place, and time.       Deep Tendon Reflexes: Reflexes normal.   Psychiatric:         Behavior: Behavior normal.       /72   Pulse 83   Temp 97.5 °F (36.4 °C) (Temporal)   Resp 20   Ht 5' 4\" (1.626 m)   Wt 148 lb 9.6 oz (67.4 kg) SpO2 100%   BMI 25.51 kg/m²     Assessment:          1. Annual physical exam    - CBC With Auto Differential; Future  - Comprehensive Metabolic Panel; Future  - Vitamin B12 & Folate; Future  - Lipid Panel; Future  - TSH With Reflex Ft4; Future  - Hemoglobin A1C; Future    2. Vitamin D deficiency    - Vitamin D 25 Hydroxy; Future    3. Chronic fatigue    - CBC With Auto Differential; Future  - Comprehensive Metabolic Panel; Future  - Vitamin B12 & Folate; Future  - TSH With Reflex Ft4; Future  - Hemoglobin A1C; Future    4. Dysautonomia orthostatic hypotension syndrome (HCC)    - AFL - Jeaneth Patel MD, Cardiology, [place]    5. Rheumatoid arthritis, involving unspecified site, unspecified rheumatoid factor presence ([address]      6. Moderate episode of recurrent major depressive disorder ([address]      7. SVT (supraventricular tachycardia) ([facility])    - Brooks Houston MD, Cardiology, [place]            Diagnosis Orders   1. Annual physical exam  CBC With Auto Differential    Comprehensive Metabolic Panel    Vitamin B12 & Folate    Lipid Panel    TSH With Reflex Ft4    Hemoglobin A1C   2. Vitamin D deficiency  Vitamin D 25 Hydroxy   3. Chronic fatigue  CBC With Auto Differential    Comprehensive Metabolic Panel    Vitamin B12 & Folate    TSH With Reflex Ft4    Hemoglobin A1C   4. Dysautonomia orthostatic hypotension syndrome ([address]  Brooks Houston MD, Cardiology, [place]   5. Rheumatoid arthritis, involving unspecified site, unspecified rheumatoid factor presence ([address]     6. Moderate episode of recurrent major depressive disorder ([address]     7. SVT (supraventricular tachycardia) ([address]  Brooks Houston MD, Cardiology, [facility]:      Return in about 3 months (around 12/4/2020) for Routine follow-up.     Orders Placed This Encounter   Procedures    CBC With Auto Differential     Standing Status:   Future     Number of Occurrences:   1     Standing Expiration Date:   9/4/2021   Cherelle Davis Comprehensive Metabolic Panel     Standing Status:   Future     Number of Occurrences:   1     Standing Expiration Date:   9/4/2021    Vitamin D 25 Hydroxy     Standing Status:   Future     Number of Occurrences:   1     Standing Expiration Date:   9/4/2021    Vitamin B12 & Folate     Standing Status:   Future     Number of Occurrences:   1     Standing Expiration Date:   9/4/2021    Lipid Panel     Standing Status:   Future     Number of Occurrences:   1     Standing Expiration Date:   12/4/2020     Order Specific Question:   Is Patient Fasting?/# of Hours     Answer: Fast 8-10 hours    TSH With Reflex Ft4     Standing Status:   Future     Number of Occurrences:   1     Standing Expiration Date:   9/4/2021    Hemoglobin A1C     Standing Status:   Future     Number of Occurrences:   1     Standing Expiration Date:   9/4/2021    JULIENNE - Lilibeth Daniel MD, Cardiology, [facility]     Referral Priority:   Routine     Referral Type:   Eval and Treat     Referral Reason:   Specialty Services Required     Referred to Provider:   Alva Lechuga MD     Requested Specialty:   Cardiology     Number of Visits Requested:   1     No orders of the defined types were placed in this encounter. Patient given educational materials - see patient instructions. Discussed use, benefit, and side effects of prescribedmedications. All patient questions answered. Pt voiced understanding. Reviewed health maintenance. Instructed to continue current medications, diet and exercise. Patient agreed with treatment plan. Follow up as directed. I spent a total of 40 minutes face to face with this patient. Over 50% of that time was spent on counseling and care coordination. Please see assessment and plan for details. Electronically signed by Neo Wheatley MD on 9/7/2020 at 4:21 PM      Please note that this chart was generated using voice recognition Dragon dictation software.   Although every effort was made to ensure the accuracy of this automatedtranscription, some errors in transcription may have occurred.

## 2020-10-12 ENCOUNTER — NURSE ONLY (OUTPATIENT)
Dept: OBGYN CLINIC | Age: 30
End: 2020-10-12
Payer: MEDICAID

## 2020-10-12 PROCEDURE — 96372 THER/PROPH/DIAG INJ SC/IM: CPT | Performed by: NURSE PRACTITIONER

## 2020-10-12 RX ORDER — MEDROXYPROGESTERONE ACETATE 150 MG/ML
150 INJECTION, SUSPENSION INTRAMUSCULAR ONCE
Status: COMPLETED | OUTPATIENT
Start: 2020-10-12 | End: 2020-10-12

## 2020-10-12 RX ADMIN — MEDROXYPROGESTERONE ACETATE 150 MG: 150 INJECTION, SUSPENSION INTRAMUSCULAR at 10:20

## 2020-10-12 NOTE — PROGRESS NOTES
Juan Mcclain is here for Depo Provera 150 mg. Given Intramuscular, Right upper quad. gluteus. Lot Number: [id number]  EXP: 4/22  Diagnosis: Irregular periods   Patient tolerated well and is to return to office in 12 weeks.

## 2020-12-31 ENCOUNTER — NURSE ONLY (OUTPATIENT)
Dept: OBGYN CLINIC | Age: 30
End: 2020-12-31
Payer: MEDICAID

## 2020-12-31 PROCEDURE — 96372 THER/PROPH/DIAG INJ SC/IM: CPT | Performed by: OBSTETRICS & GYNECOLOGY

## 2020-12-31 RX ORDER — MEDROXYPROGESTERONE ACETATE 150 MG/ML
150 INJECTION, SUSPENSION INTRAMUSCULAR ONCE
Status: COMPLETED | OUTPATIENT
Start: 2020-12-31 | End: 2020-12-31

## 2020-12-31 RX ADMIN — MEDROXYPROGESTERONE ACETATE 150 MG: 150 INJECTION, SUSPENSION INTRAMUSCULAR at 09:04

## 2021-02-05 ENCOUNTER — OFFICE VISIT (OUTPATIENT)
Dept: PRIMARY CARE CLINIC | Age: 31
End: 2021-02-05
Payer: MEDICAID

## 2021-02-05 VITALS
RESPIRATION RATE: 20 BRPM | BODY MASS INDEX: 26.16 KG/M2 | TEMPERATURE: 97.2 F | HEART RATE: 99 BPM | DIASTOLIC BLOOD PRESSURE: 70 MMHG | SYSTOLIC BLOOD PRESSURE: 134 MMHG | WEIGHT: 152.4 LBS | OXYGEN SATURATION: 100 %

## 2021-02-05 DIAGNOSIS — F33.1 MODERATE EPISODE OF RECURRENT MAJOR DEPRESSIVE DISORDER (HCC): Primary | ICD-10-CM

## 2021-02-05 PROCEDURE — G8484 FLU IMMUNIZE NO ADMIN: HCPCS | Performed by: INTERNAL MEDICINE

## 2021-02-05 PROCEDURE — 4004F PT TOBACCO SCREEN RCVD TLK: CPT | Performed by: INTERNAL MEDICINE

## 2021-02-05 PROCEDURE — G8427 DOCREV CUR MEDS BY ELIG CLIN: HCPCS | Performed by: INTERNAL MEDICINE

## 2021-02-05 PROCEDURE — 99213 OFFICE O/P EST LOW 20 MIN: CPT | Performed by: INTERNAL MEDICINE

## 2021-02-05 PROCEDURE — G8419 CALC BMI OUT NRM PARAM NOF/U: HCPCS | Performed by: INTERNAL MEDICINE

## 2021-02-05 RX ORDER — BUPROPION HYDROCHLORIDE 150 MG/1
150 TABLET ORAL EVERY MORNING
Qty: 30 TABLET | Refills: 0 | Status: SHIPPED | OUTPATIENT
Start: 2021-02-05 | End: 2021-02-26 | Stop reason: SDUPTHER

## 2021-02-05 RX ORDER — FLUOXETINE HYDROCHLORIDE 40 MG/1
40 CAPSULE ORAL DAILY
Qty: 90 CAPSULE | Refills: 0 | Status: CANCELLED | OUTPATIENT
Start: 2021-02-05

## 2021-02-10 ASSESSMENT — ENCOUNTER SYMPTOMS
VOMITING: 0
NAUSEA: 0
CONSTIPATION: 0
DIARRHEA: 0
ABDOMINAL DISTENTION: 0
SINUS PAIN: 0
BACK PAIN: 0
WHEEZING: 0
COUGH: 0
ABDOMINAL PAIN: 0
SHORTNESS OF BREATH: 0
SINUS PRESSURE: 0

## 2021-02-26 ENCOUNTER — TELEMEDICINE (OUTPATIENT)
Dept: PRIMARY CARE CLINIC | Age: 31
End: 2021-02-26
Payer: MEDICAID

## 2021-02-26 DIAGNOSIS — F41.9 ANXIETY: ICD-10-CM

## 2021-02-26 DIAGNOSIS — F33.1 MODERATE EPISODE OF RECURRENT MAJOR DEPRESSIVE DISORDER (HCC): Primary | ICD-10-CM

## 2021-02-26 PROCEDURE — 99213 OFFICE O/P EST LOW 20 MIN: CPT | Performed by: INTERNAL MEDICINE

## 2021-02-26 PROCEDURE — G8427 DOCREV CUR MEDS BY ELIG CLIN: HCPCS | Performed by: INTERNAL MEDICINE

## 2021-02-26 RX ORDER — BUPROPION HYDROCHLORIDE 150 MG/1
150 TABLET ORAL EVERY MORNING
Qty: 90 TABLET | Refills: 0 | Status: SHIPPED | OUTPATIENT
Start: 2021-02-26 | End: 2021-05-25 | Stop reason: SDUPTHER

## 2021-02-26 RX ORDER — BUSPIRONE HYDROCHLORIDE 5 MG/1
5 TABLET ORAL 2 TIMES DAILY
Qty: 60 TABLET | Refills: 0 | Status: SHIPPED | OUTPATIENT
Start: 2021-02-26 | End: 2021-03-28

## 2021-02-26 ASSESSMENT — ENCOUNTER SYMPTOMS
DIARRHEA: 0
BACK PAIN: 0
ABDOMINAL DISTENTION: 0
WHEEZING: 0
CONSTIPATION: 0
SHORTNESS OF BREATH: 0
SINUS PRESSURE: 0
SINUS PAIN: 0
VOMITING: 0
NAUSEA: 0
ABDOMINAL PAIN: 0
COUGH: 0

## 2021-03-08 ENCOUNTER — TELEPHONE (OUTPATIENT)
Dept: PRIMARY CARE CLINIC | Age: 31
End: 2021-03-08

## 2021-03-08 DIAGNOSIS — M06.9 RHEUMATOID ARTHRITIS, INVOLVING UNSPECIFIED SITE, UNSPECIFIED WHETHER RHEUMATOID FACTOR PRESENT (HCC): Primary | ICD-10-CM

## 2021-03-08 NOTE — TELEPHONE ENCOUNTER
Patient asking for referral to rheumatology, old referral in patients chart but need new one placed in order to send for patient. Order placed and faxed.

## 2021-03-10 ENCOUNTER — HOSPITAL ENCOUNTER (EMERGENCY)
Age: 31
Discharge: HOME | End: 2021-03-10
Attending: EMERGENCY MEDICINE
Payer: MEDICAID

## 2021-03-10 ENCOUNTER — APPOINTMENT (OUTPATIENT)
Dept: GENERAL RADIOLOGY | Age: 31
End: 2021-03-10
Payer: MEDICAID

## 2021-03-10 VITALS
DIASTOLIC BLOOD PRESSURE: 83 MMHG | RESPIRATION RATE: 15 BRPM | SYSTOLIC BLOOD PRESSURE: 134 MMHG | TEMPERATURE: 98.3 F | BODY MASS INDEX: 26.63 KG/M2 | HEART RATE: 89 BPM | WEIGHT: 156 LBS | OXYGEN SATURATION: 99 % | HEIGHT: 64 IN

## 2021-03-10 DIAGNOSIS — S93.401A SPRAIN OF RIGHT ANKLE, UNSPECIFIED LIGAMENT, INITIAL ENCOUNTER: Primary | ICD-10-CM

## 2021-03-10 PROCEDURE — 99284 EMERGENCY DEPT VISIT MOD MDM: CPT

## 2021-03-10 PROCEDURE — 73630 X-RAY EXAM OF FOOT: CPT

## 2021-03-10 PROCEDURE — 73610 X-RAY EXAM OF ANKLE: CPT

## 2021-03-10 RX ORDER — IBUPROFEN 600 MG/1
600 TABLET ORAL EVERY 8 HOURS PRN
Qty: 120 TABLET | Refills: 0 | Status: SHIPPED | OUTPATIENT
Start: 2021-03-10 | End: 2021-09-15

## 2021-03-10 ASSESSMENT — PAIN DESCRIPTION - ORIENTATION: ORIENTATION: RIGHT

## 2021-03-10 ASSESSMENT — PAIN DESCRIPTION - PAIN TYPE: TYPE: ACUTE PAIN

## 2021-03-10 ASSESSMENT — PAIN DESCRIPTION - LOCATION: LOCATION: ANKLE

## 2021-03-10 NOTE — ED NOTES
Pt arrives [facility] with c/o right ankle pain s/p injury. She states that she injured it 3/9/21. Pt ambulates with a limp. Comfort offered, no concerns no s/s of distress.       Maegan George RN  03/10/21 1012

## 2021-03-10 NOTE — ED PROVIDER NOTES
[address] [facility] ED  [address] [facility] [address]  Phone: [phone]  Fax: [phone]        Pt Name: Mary Best  MRN: [id number]  [place] 1990  Date of evaluation: 3/10/21      CHIEF COMPLAINT     Chief Complaint   Patient presents with    Ankle Pain     right         HISTORY OF PRESENT ILLNESS  (Location/Symptom, Timing/Onset, Context/Setting, Quality, Duration, Modifying Factors, Severity.)    Mary Best is a 27 y.o. female who presents with right ankle pain. Patient states that she inverted her ankle. She did not fall all the way down. She did not strike her head. No loss of conscious. No neck, back, or abdominal pain. REVIEW OF SYSTEMS    (2-9 systems for level 4, 10 or more for level 5)     Constitutional: no fever, chills, fatigue  HENT: No headache, nasal congestion, sore throat, hearing changes, ear pain or discharge  Eyes: no visual changes or photophobia  Respiratory: no cough, shortness of breath, or wheezing  Cardiovascular: no chest pain, palpitations, or leg swelling  Abdominal: no abdominal pain, nausea, vomiting, diarrhea, or constipation  Genitourinary: no dysuria, frequency, or urgency  Musculoskeletal: no arthralgias, myalgias, neck or back pain, +right ankle pain  Skin: no rash or wound  Neurological: no numbness, tingling or weakness  Hematologic:  no history of easy bleeding or bruising            PAST MEDICAL HISTORY    has a past medical history of Rheumatoid arthritis (HCC) and SVT (supraventricular tachycardia) ([facility] [address] SURGICAL HISTORY      has a past surgical history that includes ablation of dysrhythmic focus (2008); intrauterine device insertion (03/2017); Colposcopy (07/09/2018); and pr colposcopy,cervix w/adj vag,w/loop bx (N/A, 7/31/2018).     [address]       Discharge Medication List as of 3/10/2021 11:21 AM      CONTINUE these medications which have NOT CHANGED    Details   busPIRone (BUSPAR) 5 MG tablet Take 1 oriented to person, place, and time. Mental status is at baseline. Psychiatric:         Mood and Affect: Mood normal.         Behavior: Behavior normal.         DIFFERENTIAL DIAGNOSIS/ MDM:     The patient presented with ankle pain. A fracture was not detected on X-ray. The knee joint was not affected and the foot is stable on exam. No skin lesions were seen. There are no signs of compartment syndrome. The pulses are 2/4. The motor is 5/5. The sensation is intact. The patient was instructed to follow up in 2-3 days with their family doctor or orthopedics. We also discussed returning to the Emergency Department immediately if new or worsening symptoms occur. We have discussed the symptoms which are most concerning (e.g., increasing pain, numbness, weakness, color change or coldness to the extremity) that necessitate immediate return. The patient understands that at this time there is no evidence for a more malignant underlying process, but the patient also understands that early in the process of an illness or injury, an emergency department workup can be falsely reassuring. Routine discharge counseling was given, and the patient understands that worsening, changing or persistent symptoms should prompt an immediate call or follow up with their primary physician or return to the emergency department. The importance of appropriate follow up was also discussed. I have reviewed the disposition diagnosis with the patient and or their family/guardian. I have answered their questions and given discharge instructions. They voiced understanding of these instructions and did not have any further questions or complaints.           DIAGNOSTIC RESULTS     EKG: All EKG's are interpreted by the Emergency Department Physician who either signs or Co-signs this chart in the [address] a cardiologist.    none    RADIOLOGY:  Non-plain film images such as CT, Ultrasound and MRI are read by the radiologist. Plain radiographic AM      CONDITION ON DISPOSITION:   Stable     PATIENT REFERRED TO:  Nae Gao MD  [place] [address] [email or web address]  [phone]    Schedule an appointment as soon as possible for a visit in 2 days        DISCHARGE MEDICATIONS:  Discharge Medication List as of 3/10/2021 11:21 AM      START taking these medications    Details   ibuprofen (IBU) 600 MG tablet Take 1 tablet by mouth every 8 hours as needed for Pain, Disp-120 tablet, R-0Normal             (Please note that portions of this note were completed with a voicerecognition program.  Efforts were made to edit the dictations but occasionally words are mis-transcribed.)    Augusto Connelly MD  Attending Emergency Medicine Physician        Augusto Connelly MD  03/10/21 [id number] Jennifer Mariano  (RN)  2020 16:56:59

## 2021-03-16 ENCOUNTER — OFFICE VISIT (OUTPATIENT)
Dept: PRIMARY CARE CLINIC | Age: 31
End: 2021-03-16
Payer: MEDICAID

## 2021-03-16 VITALS — SYSTOLIC BLOOD PRESSURE: 124 MMHG | OXYGEN SATURATION: 98 % | HEART RATE: 89 BPM | DIASTOLIC BLOOD PRESSURE: 78 MMHG

## 2021-03-16 DIAGNOSIS — F33.1 MODERATE EPISODE OF RECURRENT MAJOR DEPRESSIVE DISORDER (HCC): Primary | ICD-10-CM

## 2021-03-16 PROCEDURE — G8484 FLU IMMUNIZE NO ADMIN: HCPCS | Performed by: INTERNAL MEDICINE

## 2021-03-16 PROCEDURE — G8427 DOCREV CUR MEDS BY ELIG CLIN: HCPCS | Performed by: INTERNAL MEDICINE

## 2021-03-16 PROCEDURE — G8419 CALC BMI OUT NRM PARAM NOF/U: HCPCS | Performed by: INTERNAL MEDICINE

## 2021-03-16 PROCEDURE — 4004F PT TOBACCO SCREEN RCVD TLK: CPT | Performed by: INTERNAL MEDICINE

## 2021-03-16 PROCEDURE — 99213 OFFICE O/P EST LOW 20 MIN: CPT | Performed by: INTERNAL MEDICINE

## 2021-03-16 ASSESSMENT — PATIENT HEALTH QUESTIONNAIRE - PHQ9: SUM OF ALL RESPONSES TO PHQ QUESTIONS 1-9: 2

## 2021-03-21 ASSESSMENT — ENCOUNTER SYMPTOMS
SHORTNESS OF BREATH: 0
BACK PAIN: 0
NAUSEA: 0
WHEEZING: 0
ABDOMINAL PAIN: 0
VOMITING: 0
CONSTIPATION: 0
SINUS PRESSURE: 0
ABDOMINAL DISTENTION: 0
DIARRHEA: 0
SINUS PAIN: 0
COUGH: 0

## 2021-03-22 NOTE — PROGRESS NOTES
[address] PRIMARY CARE  [address]  [address]  [address] [address]  Dept: [phone]  Dept Fax: [phone]    David Holliday is a 27 y.o. female who presents today for her medical conditions/complaints as noted below. Chief Complaint   Patient presents with    Annual Exam     6mo f/u, fell last tues, knee & ankle still painful       HPI:     This is a 80-year-old female who is here for follow-up for depression.         Hemoglobin A1C (%)   Date Value   2020 5.6             ( goal A1C is < 7)   No results found for: LABMICR  LDL Cholesterol (mg/dL)   Date Value   2020 89       (goal LDL is <100)   AST (U/L)   Date Value   2020 16     ALT (U/L)   Date Value   2020 12     BUN (mg/dL)   Date Value   2020 10     BP Readings from Last 3 Encounters:   21 124/78   03/10/21 134/83   21 134/70          (goal 120/80)    Past Medical History:   Diagnosis Date    Rheumatoid arthritis ([facility] [address]     no medications    SVT (supraventricular tachycardia) ([facility] [address] 2014      Past Surgical History:   Procedure Laterality Date    ABLATION OF DYSRHYTHMIC FOCUS      cryoablation svt    COLPOSCOPY  2018    LGSIL    INTRAUTERINE DEVICE INSERTION  2017    Paraguard, later removed    HI COLPOSCOPY,CERVIX W/ADJ VAG,W/LOOP BX N/A 2018    LEEP W/TOP HAT & COLPOSCOPY W/DEPO performed by María Elena Alfonso DO at [address] History   Problem Relation Age of Onset    High Blood Pressure Father     COPD Maternal Grandfather     Cancer Paternal Grandmother         bone       Social History     Tobacco Use    Smoking status: Current Every Day Smoker     Packs/day: 0.50     Types: Cigarettes    Smokeless tobacco: Never Used   Substance Use Topics    Alcohol use: No      Current Outpatient Medications   Medication Sig Dispense Refill    ibuprofen (IBU) 600 MG tablet Take 1 tablet by mouth every 8 hours as needed for Pain 120 tablet 0    busPIRone (BUSPAR) 5 MG tablet Take 1 tablet by mouth 2 times daily 60 tablet 0    buPROPion (WELLBUTRIN XL) 150 MG extended release tablet Take 1 tablet by mouth every morning 90 tablet 0    medroxyPROGESTERone (DEPO-PROVERA) 150 MG/ML injection Inject 1 mL into the muscle once for 1 dose 1 mL 3    medroxyPROGESTERone (DEPO-PROVERA) 150 MG/ML injection Inject 1 mL into the muscle every 3 months 1 mL 3     No current facility-administered medications for this visit. No Known Allergies    Health Maintenance   Topic Date Due    Hepatitis C screen  Never done    Varicella vaccine (1 of 2 - 2-dose childhood series) Never done    HIV screen  Never done    Pneumococcal 0-64 years Vaccine (1 of 1 - PPSV23) 09/04/2021 (Originally 10/24/1996)    Flu vaccine (1) 02/05/2022 (Originally 9/1/2020)    DTaP/Tdap/Td vaccine (2 - Td) 05/13/2023    Cervical cancer screen  07/22/2023    Hepatitis B vaccine  Completed    Hepatitis A vaccine  Aged Out    Hib vaccine  Aged Out    Meningococcal (ACWY) vaccine  Aged Out       Subjective:      Review of Systems   Constitutional: Negative for activity change, appetite change, chills, fatigue and fever. HENT: Negative for congestion, ear pain, hearing loss, nosebleeds, sinus pressure, sinus pain and sneezing. Eyes: Negative for visual disturbance. Respiratory: Negative for cough, shortness of breath and wheezing. Cardiovascular: Negative for chest pain and palpitations. Gastrointestinal: Negative for abdominal distention, abdominal pain, constipation, diarrhea, nausea and vomiting. Endocrine: Negative for cold intolerance, heat intolerance, polydipsia, polyphagia and polyuria. Genitourinary: Negative for difficulty urinating, menstrual problem, vaginal bleeding and vaginal discharge. Musculoskeletal: Negative for back pain and joint swelling. Skin: Negative for rash. Neurological: Negative for numbness and headaches. Psychiatric/Behavioral: Negative for sleep disturbance. The patient is not nervous/anxious. All other systems reviewed and are negative. Objective:     Physical Exam  Vitals signs and nursing note reviewed. Constitutional:       General: She is not in acute distress. Appearance: She is well-developed. She is not diaphoretic. HENT:      Head: Normocephalic and atraumatic. Right Ear: Hearing normal.      Left Ear: Hearing normal.      Mouth/Throat:      Pharynx: Uvula midline. Eyes:      General: No scleral icterus. Conjunctiva/sclera: Conjunctivae normal.      Pupils: Pupils are equal, round, and reactive to light. Neck:      Musculoskeletal: Full passive range of motion without pain, normal range of motion and neck supple. Thyroid: No thyroid mass or thyromegaly. Vascular: No JVD. Trachea: Phonation normal.   Cardiovascular:      Rate and Rhythm: Normal rate and regular rhythm. Pulses: No decreased pulses. Carotid pulses are 2+ on the right side and 2+ on the left side. Radial pulses are 2+ on the right side and 2+ on the left side. Heart sounds: Normal heart sounds. No murmur. Pulmonary:      Effort: Pulmonary effort is normal. No accessory muscle usage or respiratory distress. Breath sounds: Normal breath sounds. No wheezing or rales. Abdominal:      General: Bowel sounds are normal. There is no distension. Palpations: Abdomen is soft. Tenderness: There is no abdominal tenderness. Musculoskeletal: Normal range of motion. General: No deformity. Lymphadenopathy:      Cervical: No cervical adenopathy. Skin:     General: Skin is warm. Capillary Refill: Capillary refill takes less than 2 seconds. Findings: No rash. Neurological:      Mental Status: She is alert and oriented to person, place, and time.       Deep Tendon Reflexes: Reflexes normal.   Psychiatric:         Behavior: Behavior normal.       BP 124/78   Pulse 89   SpO2 98%     Assessment:          1. Moderate episode of recurrent major depressive disorder (HCC)  buspar Wellbutrin            Diagnosis Orders   1. Moderate episode of recurrent major depressive disorder ([facility] [address]             Plan:      Return in about 6 months (around 9/16/2021) for annual exam.    No orders of the defined types were placed in this encounter. No orders of the defined types were placed in this encounter. Patient given educational materials - see patient instructions. Discussed use, benefit, and side effects of prescribedmedications. All patient questions answered. Pt voiced understanding. Reviewed health maintenance. Instructed to continue current medications, diet and exercise. Patient agreed with treatment plan. Follow up as directed. I spent a total of 15 minutes face to face with this patient. Over 50% of that time was spent on counseling and care coordination. Please see assessment and plan for details. Electronically signed by Vesna Scruggs MD on 3/21/2021 at 9:00 PM      Please note that this chart was generated using voice recognition Dragon dictation software. Although every effort was made to ensure the accuracy of this automatedtranscription, some errors in transcription may have occurred.

## 2021-03-23 ENCOUNTER — PATIENT MESSAGE (OUTPATIENT)
Dept: PRIMARY CARE CLINIC | Age: 31
End: 2021-03-23

## 2021-03-23 ENCOUNTER — NURSE ONLY (OUTPATIENT)
Dept: OBGYN CLINIC | Age: 31
End: 2021-03-23
Payer: MEDICAID

## 2021-03-23 DIAGNOSIS — Z30.42 SURVEILLANCE FOR DEPO-PROVERA CONTRACEPTION: ICD-10-CM

## 2021-03-23 DIAGNOSIS — N92.1 IRREGULAR INTERMENSTRUAL BLEEDING: Primary | ICD-10-CM

## 2021-03-23 PROCEDURE — 96372 THER/PROPH/DIAG INJ SC/IM: CPT | Performed by: NURSE PRACTITIONER

## 2021-03-23 RX ORDER — BUSPIRONE HYDROCHLORIDE 10 MG/1
10 TABLET ORAL 2 TIMES DAILY
Qty: 60 TABLET | Refills: 0 | Status: SHIPPED | OUTPATIENT
Start: 2021-03-23 | End: 2021-04-16 | Stop reason: SDUPTHER

## 2021-03-23 RX ORDER — MEDROXYPROGESTERONE ACETATE 150 MG/ML
150 INJECTION, SUSPENSION INTRAMUSCULAR ONCE
Status: COMPLETED | OUTPATIENT
Start: 2021-03-23 | End: 2021-03-23

## 2021-03-23 RX ADMIN — MEDROXYPROGESTERONE ACETATE 150 MG: 150 INJECTION, SUSPENSION INTRAMUSCULAR at 09:48

## 2021-03-23 NOTE — TELEPHONE ENCOUNTER
Pended updated script with new dose patient has been taking so she does not have to take as many pills at a time.  10mg BID

## 2021-03-23 NOTE — TELEPHONE ENCOUNTER
From: Robyn Goldsmith  To: Analilia Cortez MD  Sent: 3/23/2021 9:45 AM EDT  Subject: Prescription Question    I have been taking two of the buspar since our last appointment and it seems to be helping more than just the 5mg did. I believe I have a few days left of the 5mg, but will need a refill called in to [facility] in [place] please. Thank you so much for everything you do and have done!

## 2021-03-23 NOTE — PROGRESS NOTES
Depo given; RUOQ. Tolerated well. No issues  Due for pap and med check 7/2021    Documented in [facility] tab.

## 2021-04-16 RX ORDER — BUSPIRONE HYDROCHLORIDE 10 MG/1
10 TABLET ORAL 2 TIMES DAILY
Qty: 60 TABLET | Refills: 0 | Status: SHIPPED | OUTPATIENT
Start: 2021-04-16 | End: 2021-05-26 | Stop reason: SDUPTHER

## 2021-05-25 DIAGNOSIS — F33.1 MODERATE EPISODE OF RECURRENT MAJOR DEPRESSIVE DISORDER (HCC): ICD-10-CM

## 2021-05-26 RX ORDER — BUSPIRONE HYDROCHLORIDE 10 MG/1
10 TABLET ORAL 2 TIMES DAILY
Qty: 60 TABLET | Refills: 0 | Status: SHIPPED | OUTPATIENT
Start: 2021-05-26 | End: 2021-06-28 | Stop reason: SDUPTHER

## 2021-05-26 RX ORDER — BUPROPION HYDROCHLORIDE 150 MG/1
150 TABLET ORAL EVERY MORNING
Qty: 90 TABLET | Refills: 0 | Status: SHIPPED | OUTPATIENT
Start: 2021-05-26 | End: 2021-08-30 | Stop reason: SDUPTHER

## 2021-06-08 ENCOUNTER — NURSE ONLY (OUTPATIENT)
Dept: OBGYN CLINIC | Age: 31
End: 2021-06-08
Payer: MEDICAID

## 2021-06-08 DIAGNOSIS — Z30.42 SURVEILLANCE FOR DEPO-PROVERA CONTRACEPTION: ICD-10-CM

## 2021-06-08 DIAGNOSIS — N92.1 IRREGULAR INTERMENSTRUAL BLEEDING: Primary | ICD-10-CM

## 2021-06-08 PROCEDURE — 96372 THER/PROPH/DIAG INJ SC/IM: CPT | Performed by: NURSE PRACTITIONER

## 2021-06-08 RX ORDER — MEDROXYPROGESTERONE ACETATE 150 MG/ML
150 INJECTION, SUSPENSION INTRAMUSCULAR ONCE
Status: COMPLETED | OUTPATIENT
Start: 2021-06-08 | End: 2021-06-08

## 2021-06-08 RX ADMIN — MEDROXYPROGESTERONE ACETATE 150 MG: 150 INJECTION, SUSPENSION INTRAMUSCULAR at 09:11

## 2021-06-08 NOTE — PROGRESS NOTES
Depo given; LUOQ. Tolerated well. Will talk to Dr. Louise Moralez at her annual visit- she has noticed a lower sex drive and would like to discuss options/poissibly switch to IUD, she had the copper IUD years ago. Did not like it due to heavy cycles.

## 2021-06-08 NOTE — PATIENT INSTRUCTIONS
Patient Education        Intrauterine Device (IUD) for Birth Control: Care Instructions  Your Care Instructions     The intrauterine device (IUD) is used to prevent pregnancy. It's a small, plastic, T-shaped device. Your doctor places the IUD in your uterus. If you and your doctor discuss it before you give birth, this can be done right after you have your baby. You are using either a hormonal IUD or a copper IUD. · Hormonal IUDs prevent pregnancy for 3 to 6 years, depending on which IUD is used. But your doctor may talk to you about leaving it in for longer. Once you have it, you don't have to do anything else to prevent pregnancy. · The copper IUD prevents pregnancy for 10 years. But your doctor may talk to you about leaving it in for longer. Once you have it, you don't have to do anything to prevent pregnancy. The IUD usually stays in the uterus until your doctor removes it. Follow-up care is a key part of your treatment and safety. Be sure to make and go to all appointments, and call your doctor if you are having problems. It's also a good idea to know your test results and keep a list of the medicines you take. How can you care for yourself at home? How do you use the IUD? · Your doctor inserts the IUD. This takes only a few minutes and can be done at your doctor's office. If you and your doctor discuss it before you give birth, the IUD can also be inserted right after you have your baby. · Your doctor may have you feel for the IUD string right after insertion, to be sure you know what it feels like. · If you want to check for the string on your own:  ? Insert a finger into your vagina and feel for the cervix, which is at the top of the vagina and feels harder than the rest of your vagina (some women say it feels like the tip of your nose). ? You should be able to feel the thin, plastic string coming out of the opening of your cervix.  If you cannot feel the string, it doesn't always mean that the IUD is out of place. Sometimes the string is just difficult to feel or has been pulled up into the cervical canal (which will not harm you). · Your doctor may want to see you 4 to 6 weeks after the IUD insertion, to make sure it is in place. What if you think the IUD is not in place? Always read the label for specific instructions. Here are some basic guidelines:  · Call your doctor and use backup birth control, such as a condom, or don't have intercourse until you know the IUD is working. · If you had intercourse, you can use emergency contraception to help prevent pregnancy. The most effective emergency contraception is prescribed by a doctor. This includes a prescription pill. If your IUD is no longer in place, a doctor may be able to insert a copper IUD as emergency contraception. You can also get emergency contraceptive pills without a prescription at most drugstores. What about side effects, safe sex, and breastfeeding? · The IUD can have side effects. ? The hormonal IUD usually reduces menstrual flow and cramping over time. It can also cause spotting, mood swings, and breast tenderness. ? The copper IUD can cause longer and heavier periods. · After an IUD is first put in, you may have some mild cramping and light spotting for 1 to 2 days. · The IUD doesn't protect against sexually transmitted infections (STIs), such as herpes and HIV/AIDS. If you're not sure whether your sex partner might have an STI, use a condom to protect against disease. · It's safe to use while breastfeeding. When should you call for help? Call your doctor now or seek immediate medical care if:    · You have pain in your belly or pelvis.     · You have severe vaginal bleeding. This means that you are soaking through your usual pads or tampons each hour for 2 or more hours.     · You have vaginal discharge that smells bad.     · You have a fever.    Watch closely for changes in your health, and be sure to contact your doctor if you have any problems. Where can you learn more? Go to https://chpepiceweb.healthInception Sciencespartners. org and sign in to your [employer] account. Enter P844 in the [facility] box to learn more about \"Intrauterine Device (IUD) for Birth Control: Care Instructions. \"     If you do not have an account, please click on the \"Sign Up Now\" link. Current as of: October 8, 2020               Content Version: 12.8  © 2006-2021 [employer]. Care instructions adapted under license by [facility] [address] If you have questions about a medical condition or this instruction, always ask your healthcare professional. [place] [address] any warranty or liability for your use of this information. Patient Education        Intrauterine Device (IUD) Insertion: Care Instructions  Your Care Instructions     An intrauterine device (IUD) is a very effective method of birth control. It is a small, plastic, T-shaped device that contains copper or hormones. The doctor inserts the IUD into your uterus. You can have an IUD inserted at any time, as long as you aren't pregnant and you don't have a pelvic infection. If you and your doctor discuss it before you give birth, this can be done right after you have your baby. A plastic string tied to the end of the IUD hangs down through the cervix into the vagina. Your doctor may have you feel for the IUD string right after insertion, to be sure you know what it feels like. There are two types of IUDs. The copper IUD works for up to 10 years. The hormonal IUD works for either 3 years or 6 years, depending on which IUD is used. But your doctor may talk to you about leaving it in for longer. The hormonal IUD also reduces menstrual bleeding and cramping. Follow-up care is a key part of your treatment and safety. Be sure to make and go to all appointments, and call your doctor if you are having problems.  It's also a good idea to know your test results and keep a list of the medicines you take. How can you care for yourself at home? · It's safe to use while breastfeeding. · You may experience some mild cramping and light bleeding (spotting) for 1 or 2 days. Use a hot water bottle or a heating pad set on low on your belly for pain. · Take an over-the-counter pain medicine, such as acetaminophen (Tylenol), ibuprofen (Advil, Motrin), and naproxen (Aleve) if needed. Read and follow all instructions on the label. · Do not take two or more pain medicines at the same time unless the doctor told you to. Many pain medicines have acetaminophen, which is Tylenol. Too much acetaminophen (Tylenol) can be harmful. · If you want to check the string of your IUD, insert a finger into your vagina and feel for the cervix, which is at the top of the vagina and feels harder than the rest of your vagina. You should be able to feel the thin, plastic string coming out of the opening of your cervix. If you cannot feel the string, use another form of birth control and make an appointment with your doctor to have the string checked. · If the IUD comes out, save it and call your doctor. Be sure to use another form of birth control while the IUD is out. · Use latex condoms to protect against sexually transmitted infections (STIs), such as gonorrhea and chlamydia. An IUD does not protect you from STIs. Having one sex partner (who does not have STIs and does not have sex with anyone else) is a good way to avoid STIs. When should you call for help? Call 911 anytime you think you may need emergency care. For example, call if:    · You passed out (lost consciousness).     · You have sudden, severe pain in your belly or pelvis. Call your doctor now or seek immediate medical care if:    · You have new belly or pelvic pain.     · You have severe vaginal bleeding.  This means that you are soaking through your usual pads or tampons each hour for 2 or more hours.     · You are dizzy or lightheaded, or you feel like you may faint.     · You have a fever and pelvic pain or vaginal discharge.     · You have pelvic pain that is getting worse. Watch closely for changes in your health, and be sure to contact your doctor if:    · You cannot feel the string, or the IUD comes out.     · You feel sick to your stomach, or you vomit.     · You think you may be pregnant. Where can you learn more? Go to https://chpeyangeweb.healthDymant. org and sign in to your [email or web address] account. Enter N381 in the [employer] box to learn more about \"Intrauterine Device (IUD) Insertion: Care Instructions. \"     If you do not have an account, please click on the \"Sign Up Now\" link. Current as of: October 8, 2020               Content Version: 12.8  © 3635-3171 [employer]. Care instructions adapted under license by [facility] ([facility]). If you have questions about a medical condition or this instruction, always ask your healthcare professional. [place] [address] any warranty or liability for your use of this information.

## 2021-06-28 RX ORDER — BUSPIRONE HYDROCHLORIDE 10 MG/1
10 TABLET ORAL 2 TIMES DAILY
Qty: 60 TABLET | Refills: 5 | Status: SHIPPED | OUTPATIENT
Start: 2021-06-28 | End: 2021-07-28

## 2021-07-29 ENCOUNTER — HOSPITAL ENCOUNTER (OUTPATIENT)
Age: 31
Setting detail: SPECIMEN
Discharge: HOME | End: 2021-07-29
Payer: MEDICAID

## 2021-07-29 ENCOUNTER — OFFICE VISIT (OUTPATIENT)
Dept: OBGYN CLINIC | Age: 31
End: 2021-07-29
Payer: MEDICAID

## 2021-07-29 VITALS — DIASTOLIC BLOOD PRESSURE: 62 MMHG | SYSTOLIC BLOOD PRESSURE: 110 MMHG | WEIGHT: 146 LBS | BODY MASS INDEX: 25.06 KG/M2

## 2021-07-29 DIAGNOSIS — Z01.419 ENCOUNTER FOR ANNUAL ROUTINE GYNECOLOGICAL EXAMINATION: Primary | ICD-10-CM

## 2021-07-29 PROCEDURE — 99395 PREV VISIT EST AGE 18-39: CPT | Performed by: OBSTETRICS & GYNECOLOGY

## 2021-07-29 NOTE — PROGRESS NOTES
History and Physical    Zoie Abraham  2021              27 y.o. Chief Complaint   Patient presents with    Gynecologic Exam       No LMP recorded. Patient has had an injection. Primary Care Physician: Adam Stover MD    The patient was seen and examined. She has no chief complaint today and is here for her annual exam.  Her bowels are regular. There are no voiding complaints. She denies any bloating. She denies vaginal discharge and was counseled on STD's and the need for barrier contraception. HPI : Zoie Abraham is a 27 y.o. female     She is feeling well. She has been happy on Depo Provera.   No complaints today, declining cultures    ________________________________________________________________________  OB History    Para Term  AB Living   2 1 1 0 1 1   SAB TAB Ectopic Molar Multiple Live Births   1 0 0 0 0 1      # Outcome Date GA Lbr Ayaan/2nd Weight Sex Delivery Anes PTL Lv   2 Term 2017 39w0d  6 lb 1 oz (2.75 kg) M Vag-Spont  N CESAR   1 SAB              Past Medical History:   Diagnosis Date    Rheumatoid arthritis ([facility]     no medications    SVT (supraventricular tachycardia) ([facility] 2014                                                                   Past Surgical History:   Procedure Laterality Date    ABLATION OF DYSRHYTHMIC FOCUS      cryoablation svt    COLPOSCOPY  2018    LGSIL    INTRAUTERINE DEVICE INSERTION  2017    Paraguard, later removed    ND COLPOSCOPY,CERVIX W/ADJ VAG,W/LOOP BX N/A 2018    LEEP W/TOP HAT & COLPOSCOPY W/DEPO performed by Lindy Rosas DO at [address] [place] [address] History   Problem Relation Age of Onset    High Blood Pressure Father     COPD Maternal Grandfather     Cancer Paternal Grandmother         bone     Social History     Socioeconomic History    Marital status:      Spouse name: Not on file    Number of children: Not on file    Years of education: Not on file   Yvonne Holguin education level: Not on file   Occupational History    Not on file   Tobacco Use    Smoking status: Current Every Day Smoker     Packs/day: 0.50     Types: Cigarettes    Smokeless tobacco: Never Used   Vaping Use    Vaping Use: Never used   Substance and Sexual Activity    Alcohol use: No    Drug use: No    Sexual activity: Yes     Partners: Male   Other Topics Concern    Not on file   Social History Narrative    Not on file     Social Determinants of Health     Financial Resource Strain:     Difficulty of Paying Living Expenses:    Food Insecurity:     Worried About Running Out of Food in the Last Year:     Ran Out of Food in the Last Year:    Transportation Needs:     Lack of Transportation (Medical):  Lack of Transportation (Non-Medical):    Physical Activity:     Days of Exercise per Week:     Minutes of Exercise per Session:    Stress:     Feeling of Stress :    Social Connections:     Frequency of Communication with Friends and Family:     Frequency of Social Gatherings with Friends and Family:     Attends Congregation Services:     Active Member of Clubs or Organizations:     Attends Club or Organization Meetings:     Marital Status:    Intimate Partner Violence:     Fear of Current or Ex-Partner:     Emotionally Abused:     Physically Abused:     Sexually Abused:        MEDICATIONS:  Current Outpatient Medications   Medication Sig Dispense Refill    buPROPion (WELLBUTRIN XL) 150 MG extended release tablet Take 1 tablet by mouth every morning 90 tablet 0    ibuprofen (IBU) 600 MG tablet Take 1 tablet by mouth every 8 hours as needed for Pain 120 tablet 0    medroxyPROGESTERone (DEPO-PROVERA) 150 MG/ML injection Inject 1 mL into the muscle every 3 months 1 mL 3     No current facility-administered medications for this visit.            ALLERGIES:  Allergies as of 07/29/2021    (No Known Allergies)       Symptoms of decreased mood absent      Immunization status: stated as current, but no records available. Gynecologic History:     No LMP recorded. Patient has had an injection. Sexually Active: Yes    STD History: No     Permanent Sterilization: No   Reversible Birth Control: Yes Depo Provera        Hormone Replacement Exposure: No      Genetic Qualified Family History of Breast, Ovarian , Colon or Uterine Cancer: No     If YES see scanned worksheet. Preventative Health Testing:    Health Maintenance:  Health Maintenance Due   Topic Date Due    Hepatitis C screen  Never done    Varicella vaccine (1 of 2 - 2-dose childhood series) Never done    COVID-19 Vaccine (1) Never done    HIV screen  Never done       Date of Last Pap Smear: 7/22/20  Abnormal Pap Smear History: LSIL 5/9/18  Colposcopy History: SHEYLA 2-3 7/9/18 with subsequent LEEP  Date of Last Mammogram: no  Date of Last Colonoscopy:   Date of Last Bone Density:      ________________________________________________________________________    REVIEW OF SYSTEMS:       A minimum of an eleven point review of systems was completed. Review Of Systems (11 point):  Constitutional: No fever, chills or malaise; No weight change or fatigue  Head and Eyes: No vision, Headache, Dizziness or trauma in last 12 months  ENT ROS: No hearing, Tinnitis, sinus or taste problems  Hematological and Lymphatic ROS:No Lymphoma, Von Willebrand's, Hemophillia or Bleeding History  Psych ROS: No Depression, Homicidal thoughts,suicidal thoughts, or anxiety  Breast ROS: No prior breast abnormalities or lumps  Respiratory ROS: No SOB, Pneumoniae,Cough, or Pulmonary Embolism History  Cardiovascular ROS: No Chest Pain with Exertion, Palpitations, Syncope, Edema, Arrhythmia  Gastrointestinal ROS: No Indigestion, Heartburn, Nausea, vomiting, Diarrhea, Constipation,or Bowel Changes; No Bloody Stools or melena  Genito-Urinary ROS: No Dysuria, Hematuria or Nocturia.  No Urinary Incontinence or Vaginal Discharge  Musculoskeletal ROS: No Arthralgia, Arthritis,Gout,Osteoporosis or Rheumatism  Neurological ROS: No CVA, Migraines, Epilepsy, Seizure Hx, or Limb Weakness  Dermatological ROS: No Rash, Itching, Hives, Mole Changes or Cancer                                                                                                                                                                                                                                  PHYSICAL Exam:     Constitutional:  Vitals:    07/29/21 1359   BP: 110/62   Site: Right Upper Arm   Position: Sitting   Cuff Size: Small Adult   Weight: 146 lb (66.2 kg)         General Appearance: This  is a well Developed, well Nourished, well groomed female. Her BMI was reviewed. Nutritional decision making was discussed. Skin:  There was a Normal Inspection of the skin without rashes or lesions. There were no rashes. Lymphatic:  No Lymph Nodes were Palpable in the neck , axilla or groin.  0 # Of Lymph Nodes  Neck and EENT:  The neck was supple. There were no masses   The thyroid was not enlarged and had no masses. EOMI B/L    Respiratory: The lungs were auscultated and found to be clear. There were no rales, rhonchi or wheezes. There was a good respiratory effort. Cardiovascular: The heart was in a regular rate and rhythm. . No S3 or S4. There was no murmur appreciated. Location, grade, and radiation are not applicable. Extremities: The patients extremities were without calf tenderness, edema, or varicosities. There was full range of motion in all four extremities. Pulses in all four extremities were appreciated and are 2/4. Abdomen: The abdomen was soft and non-tender. There were good bowel sounds in all quadrants and there was no guarding, rebound or rigidity. Abdominal Scars: none    Psych:   The patient had a normal Orientation to: Time, Place, Person, and Situation  There is no Mood / Affect changes    Breast:  (Chest)  normal appearance, no masses or tenderness  Self breast exams were reviewed in detail. Literature was given. Pelvic Exam:  Vulva and vagina appear normal. Bimanual exam reveals normal uterus and adnexa. Rectal Exam:  exam declined by patient          Musculosk:  Normal Gait and station was noted. Digits were evaluated without abnormal findings. Range of motion, stability and strength were evaluated and found to be appropriate for the patients age. OMM Structural Component:  The patient did not complain of a Chief complaint requiring OMM. Chief Complaint:none    Structural Exam: Refused      ASSESSMENT:      27 y.o. Annual   Diagnosis Orders   1. Encounter for annual routine gynecological examination  PAP SMEAR          Chief Complaint   Patient presents with    Gynecologic Exam          Past Medical History:   Diagnosis Date    Rheumatoid arthritis ([address]     no medications    SVT (supraventricular tachycardia) ([address] 7/1/2014         Patient Active Problem List    Diagnosis Date Noted    Moderate episode of recurrent major depressive disorder ([address] 09/04/2020    Status post cryoablation 12/20/2019    Vulvar skin tag 08/14/2018     Left side. Does not want removed or biopsied      Dysplasia of cervix, high grade SHEYLA 2 07/31/2018     LEEP 7/31/18  7/31/18 LEEP with top hat, Depo-Provera injection in L gluteal muscle 07/31/2018    Hx of IUD use 05/01/2017     Paraguard removed 5/10/18      SVT (supraventricular tachycardia) ([address] 07/02/2014     s/p ablation       Dysautonomia orthostatic hypotension syndrome 07/02/2014    Rheumatoid arthritis ([address] 07/02/2014          Hereditary Breast, Ovarian, Colon and Uterine Cancer screening Done. Tobacco & Secondary smoke risks reviewed; instructed on cessation and avoidance      Counseling Completed:  Preventative Health Recommendations and Follow up.   Counseling Hormonal Based Birth Control:      The patient was seen and counseled on all forms of birth control both male and female reversible and non. She is aware that hormonal based birth control may increase her risk of developing a blood clot which may increase her morbidity and or mortality. She was counseled on alternate non hormonal based contraception options. We discussed that smoking and any hormonal based contraception may increase the patients risks of developing these life threatening blood clots. All patients are encouraged to stop smoking at the time of contraceptive counseling. Cessation programs were reviewed. The patient was instructed to use barrier contraception for sexually transmitted disease prevention. The patient was also informed of antibiotics decreasing contraceptive efficacy and the need for barrier contraception from the onset of her antibiotic dosing and through a minimum of thirty days from antibiotic cessation. The life threatening side effect profile was reviewed in detail this includes but is not limited to shortness of breath, chest pain, severe or persistent headaches, or calf pain. If any of these occur the patient has been instructed to stop using her hormonal based contraception, notify the office, and go to the emergency department or call 911. The patient denied any personal history of blood clots in her leg, lung, or heart and denied any family history of stroke, TIA, sudden cardiac death < 36 y.o.,pulmonary embolism, or deep venous thrombosis. PLAN:  Return in about 1 year (around 7/29/2022), or if symptoms worsen or fail to improve, for annual.   Depo Provera   Repeat Annual every 1 year  Cervical Cytology Evaluation begins at 24years old. If Negative Cytology, Follow-up screening per current guidelines. Birth control and barrier recommendations discussed. STD counseling and prevention reviewed. Routine health maintenance per patients PCP. Orders Placed This Encounter   Procedures    PAP SMEAR     Patient History:    No LMP recorded. Patient has had an injection.   OBGYN Status: Injection  Past Surgical History:  2008: ABLATION OF DYSRHYTHMIC FOCUS      Comment:  cryoablation svt  07/09/2018: COLPOSCOPY      Comment:  LGSIL  03/2017: INTRAUTERINE DEVICE INSERTION      Comment:  Laura, later removed  7/31/2018: [place] COLPOSCOPY,CERVIX W/ADJ VAG,W/LOOP BX; N/A      Comment:  LEEP W/TOP HAT & COLPOSCOPY W/DEPO performed by Branden Waters DO at [id number]  Medications/Contraceptives Affecting Cytology     Progestin Contraceptives - Injectable Disp Start End     medroxyPROGESTERone (DEPO-PROVERA) 150 MG/ML injection    1 mL 2/10/2020     Sig: Inject 1 mL into the muscle every 3 months    Route: Intramuscular     Problem List       Edg Problems Affecting Cytology    Dysplasia of cervix, high grade SHEYLA 2    Overview Signed 7/31/2018  8:41 AM by Charlotte Johnson DO     LEEP 7/31/18        Social History    Tobacco Use      Smoking status: Current Every Day Smoker        Packs/day: 0.50        Types: Cigarettes      Smokeless tobacco: Never Used       Standing Status:   Future     Standing Expiration Date:   7/30/2022     Order Specific Question:   Collection Type     Answer: Thin Prep     Order Specific Question:   Prior Abnormal Pap Test     Answer:   No     Order Specific Question:   Screening or Diagnostic     Answer:   Screening     Order Specific Question:   HPV Requested?      Answer:   Yes     Order Specific Question:   High Risk Patient     Answer:   N/A

## 2021-08-02 LAB
HPV SAMPLE: NORMAL
HPV, GENOTYPE 16: NOT DETECTED
HPV, GENOTYPE 18: NOT DETECTED
HPV, HIGH RISK OTHER: NOT DETECTED
HPV, INTERPRETATION: NORMAL
SPECIMEN DESCRIPTION: NORMAL

## 2021-08-10 LAB — CYTOLOGY REPORT: NORMAL

## 2021-08-23 DIAGNOSIS — N92.1 IRREGULAR INTERMENSTRUAL BLEEDING: ICD-10-CM

## 2021-08-23 RX ORDER — MEDROXYPROGESTERONE ACETATE 150 MG/ML
INJECTION, SUSPENSION INTRAMUSCULAR
Qty: 1 ML | Refills: 3 | Status: SHIPPED | OUTPATIENT
Start: 2021-08-23 | End: 2021-12-27

## 2021-08-24 ENCOUNTER — NURSE ONLY (OUTPATIENT)
Dept: OBGYN CLINIC | Age: 31
End: 2021-08-24
Payer: MEDICAID

## 2021-08-24 DIAGNOSIS — N92.1 IRREGULAR INTERMENSTRUAL BLEEDING: Primary | ICD-10-CM

## 2021-08-24 DIAGNOSIS — Z30.42 SURVEILLANCE FOR DEPO-PROVERA CONTRACEPTION: ICD-10-CM

## 2021-08-24 PROCEDURE — 96372 THER/PROPH/DIAG INJ SC/IM: CPT | Performed by: NURSE PRACTITIONER

## 2021-08-24 RX ORDER — MEDROXYPROGESTERONE ACETATE 150 MG/ML
150 INJECTION, SUSPENSION INTRAMUSCULAR ONCE
Status: COMPLETED | OUTPATIENT
Start: 2021-08-24 | End: 2021-08-24

## 2021-08-24 RX ADMIN — MEDROXYPROGESTERONE ACETATE 150 MG: 150 INJECTION, SUSPENSION INTRAMUSCULAR at 09:28

## 2021-08-24 NOTE — PROGRESS NOTES
Depo given; ruoq- still deciding to use the depo she has appt with family dr. Марина peña, tolerated injection well    Documented in [facility]

## 2021-08-30 DIAGNOSIS — F33.1 MODERATE EPISODE OF RECURRENT MAJOR DEPRESSIVE DISORDER (HCC): ICD-10-CM

## 2021-08-30 RX ORDER — BUPROPION HYDROCHLORIDE 150 MG/1
150 TABLET ORAL EVERY MORNING
Qty: 90 TABLET | Refills: 0 | Status: SHIPPED | OUTPATIENT
Start: 2021-08-30 | End: 2021-11-30 | Stop reason: SDUPTHER

## 2021-08-30 NOTE — TELEPHONE ENCOUNTER
----- Message from Momorigoberto Yessicatenatiesha sent at 8/30/2021  7:17 AM EDT -----  Subject: Refill Request    QUESTIONS  Name of Medication? buPROPion (WELLBUTRIN XL) 150 MG extended release   tablet  Patient-reported dosage and instructions? Take 1 tablet by mouth every   morning  How many days do you have left? 0  Preferred Pharmacy? [address]  Pharmacy phone number (if available)? [phone]  Additional Information for Provider? PT has NP apt set for 9/15 but is out   of medication.   ---------------------------------------------------------------------------  --------------  CALL BACK INFO  What is the best way for the office to contact you? OK to leave message on   voicemail  Preferred Call Back Phone Number?  [phone]

## 2021-09-15 ENCOUNTER — OFFICE VISIT (OUTPATIENT)
Dept: PRIMARY CARE CLINIC | Age: 31
End: 2021-09-15
Payer: MEDICAID

## 2021-09-15 VITALS
BODY MASS INDEX: 24.41 KG/M2 | OXYGEN SATURATION: 96 % | WEIGHT: 142.2 LBS | DIASTOLIC BLOOD PRESSURE: 78 MMHG | HEART RATE: 84 BPM | SYSTOLIC BLOOD PRESSURE: 118 MMHG

## 2021-09-15 DIAGNOSIS — F32.A ANXIETY AND DEPRESSION: Primary | ICD-10-CM

## 2021-09-15 DIAGNOSIS — M06.9 RHEUMATOID ARTHRITIS, INVOLVING UNSPECIFIED SITE, UNSPECIFIED WHETHER RHEUMATOID FACTOR PRESENT (HCC): ICD-10-CM

## 2021-09-15 DIAGNOSIS — I95.1 DYSAUTONOMIA ORTHOSTATIC HYPOTENSION SYNDROME: ICD-10-CM

## 2021-09-15 DIAGNOSIS — F41.9 ANXIETY AND DEPRESSION: Primary | ICD-10-CM

## 2021-09-15 DIAGNOSIS — I47.1 SVT (SUPRAVENTRICULAR TACHYCARDIA) (HCC): ICD-10-CM

## 2021-09-15 PROBLEM — G90.3 MULTI-SYSTEM DEGENERATION OF THE AUTONOMIC NERVOUS SYSTEM (HCC): Status: RESOLVED | Noted: 2021-09-15 | Resolved: 2021-09-15

## 2021-09-15 PROBLEM — G90.3 MULTI-SYSTEM DEGENERATION OF THE AUTONOMIC NERVOUS SYSTEM (HCC): Status: ACTIVE | Noted: 2021-09-15

## 2021-09-15 PROCEDURE — G8427 DOCREV CUR MEDS BY ELIG CLIN: HCPCS | Performed by: FAMILY MEDICINE

## 2021-09-15 PROCEDURE — 4004F PT TOBACCO SCREEN RCVD TLK: CPT | Performed by: FAMILY MEDICINE

## 2021-09-15 PROCEDURE — G8420 CALC BMI NORM PARAMETERS: HCPCS | Performed by: FAMILY MEDICINE

## 2021-09-15 PROCEDURE — 99204 OFFICE O/P NEW MOD 45 MIN: CPT | Performed by: FAMILY MEDICINE

## 2021-09-15 RX ORDER — HYDROXYZINE HYDROCHLORIDE 25 MG/1
25 TABLET, FILM COATED ORAL EVERY 8 HOURS PRN
Qty: 30 TABLET | Refills: 0 | Status: SHIPPED | OUTPATIENT
Start: 2021-09-15 | End: 2021-09-25

## 2021-09-15 SDOH — ECONOMIC STABILITY: TRANSPORTATION INSECURITY
IN THE PAST 12 MONTHS, HAS THE LACK OF TRANSPORTATION KEPT YOU FROM MEDICAL APPOINTMENTS OR FROM GETTING MEDICATIONS?: NO

## 2021-09-15 SDOH — ECONOMIC STABILITY: FOOD INSECURITY: WITHIN THE PAST 12 MONTHS, YOU WORRIED THAT YOUR FOOD WOULD RUN OUT BEFORE YOU GOT MONEY TO BUY MORE.: NEVER TRUE

## 2021-09-15 SDOH — ECONOMIC STABILITY: FOOD INSECURITY: WITHIN THE PAST 12 MONTHS, THE FOOD YOU BOUGHT JUST DIDN'T LAST AND YOU DIDN'T HAVE MONEY TO GET MORE.: NEVER TRUE

## 2021-09-15 SDOH — ECONOMIC STABILITY: TRANSPORTATION INSECURITY
IN THE PAST 12 MONTHS, HAS LACK OF TRANSPORTATION KEPT YOU FROM MEETINGS, WORK, OR FROM GETTING THINGS NEEDED FOR DAILY LIVING?: NO

## 2021-09-15 ASSESSMENT — SOCIAL DETERMINANTS OF HEALTH (SDOH): HOW HARD IS IT FOR YOU TO PAY FOR THE VERY BASICS LIKE FOOD, HOUSING, MEDICAL CARE, AND HEATING?: NOT HARD AT ALL

## 2021-09-15 ASSESSMENT — ENCOUNTER SYMPTOMS
VOMITING: 0
NAUSEA: 0
SHORTNESS OF BREATH: 0

## 2021-09-15 NOTE — PROGRESS NOTES
[address] PRIMARY CARE  [address] [facility]  [address] [address]  Dept: [phone]  Dept Fax: [phone]    Eusebio Houston is a 27 y.o. female who presents today for her medical conditions/complaints as noted below. Major Shade is c/o of  Chief Complaint   Patient presents with    Depression    Anxiety     had recent family loss         HPI:     HPI     Pt seen today to establish care with new pcp    Hx of depression that was well controlled on wellbutrin. Lately has been having hard time with anxiety due to recent loss of brother in law in car accident. She is open to seeing therapist to help with her symptoms. Hx of SVT in past, follows with cardiology, has been doing well. Follows once a year with them. Sees rheumatology for rheumatoid arthritis. Was on humera in past, not currently on any medication for it. Gets MRI of her wrist soon and has been following up with Dr. Kierra Hi. . Was started amitriptyline for tingling/numbness in hands but caused drowsiness in morning when she woke up so she couldn't take it. . her grandmother and sister of grandmother had RA. On depo, follows with dr Ryder Curry. Denies any heavy bleeding while taking it.          Hemoglobin A1C (%)   Date Value   2020 5.6             ( goal A1C is < 7)   No results found for: LABMICR  LDL Cholesterol (mg/dL)   Date Value   2020 89       (goal LDL is <100)   AST (U/L)   Date Value   2020 16     ALT (U/L)   Date Value   2020 12     BUN (mg/dL)   Date Value   2020 10     BP Readings from Last 3 Encounters:   09/15/21 118/78   21 110/62   21 124/78          (goal 120/80)    Past Medical History:   Diagnosis Date    Rheumatoid arthritis ([address]     no medications    SVT [facility]supraventricular tachycardia) ([facility] 2014      Past Surgical History:   Procedure Laterality Date    ABLATION OF DYSRHYTHMIC FOCUS      cryoablation svt    COLPOSCOPY  07/09/2018    LGSIL    INTRAUTERINE DEVICE INSERTION  03/2017    Paraguard, later removed    [place] COLPOSCOPY,CERVIX W/ADJ VAG,W/LOOP BX N/A 7/31/2018    LEEP W/TOP HAT & COLPOSCOPY W/DEPO performed by Tony Lau DO at [facility] History   Problem Relation Age of Onset    High Blood Pressure Father     COPD Maternal Grandfather     Cancer Paternal Grandmother         bone       Social History     Tobacco Use    Smoking status: Current Every Day Smoker     Packs/day: 0.50     Types: Cigarettes    Smokeless tobacco: Never Used   Substance Use Topics    Alcohol use: No      Current Outpatient Medications   Medication Sig Dispense Refill    hydrOXYzine (ATARAX) 25 MG tablet Take 1 tablet by mouth every 8 hours as needed for Itching 30 tablet 0    buPROPion (WELLBUTRIN XL) 150 MG extended release tablet Take 1 tablet by mouth every morning 90 tablet 0    medroxyPROGESTERone (DEPO-PROVERA) 150 MG/ML injection inject 1 ml intramuscularly once for 1 dose 1 mL 3    ibuprofen (IBU) 600 MG tablet Take 1 tablet by mouth every 8 hours as needed for Pain 120 tablet 0     No current facility-administered medications for this visit. No Known Allergies    Health Maintenance   Topic Date Due    Hepatitis C screen  Never done    Varicella vaccine (1 of 2 - 2-dose childhood series) Never done    Pneumococcal 0-64 years Vaccine (1 of 2 - PPSV23) Never done    COVID-19 Vaccine (1) Never done    HIV screen  Never done    Flu vaccine (1) 09/15/2022 (Originally 9/1/2021)    DTaP/Tdap/Td vaccine (2 - Td or Tdap) 05/13/2023    Cervical cancer screen  07/29/2026    Hepatitis B vaccine  Completed    Hepatitis A vaccine  Aged Out    Hib vaccine  Aged Out    Meningococcal (ACWY) vaccine  Aged Out       Subjective:      Review of Systems   Constitutional: Negative for chills and fever. Respiratory: Negative for shortness of breath. Cardiovascular: Negative for chest pain and palpitations. Gastrointestinal: Negative for nausea and vomiting. Musculoskeletal: Positive for arthralgias. Psychiatric/Behavioral: Positive for dysphoric mood. The patient is nervous/anxious. Objective:     Physical Exam  Constitutional:       Appearance: She is well-developed. HENT:      Head: Normocephalic and atraumatic. Right Ear: External ear normal.      Left Ear: External ear normal.   Eyes:      Conjunctiva/sclera: Conjunctivae normal.      Pupils: Pupils are equal, round, and reactive to light. Cardiovascular:      Rate and Rhythm: Normal rate and regular rhythm. Heart sounds: Normal heart sounds. Pulmonary:      Effort: Pulmonary effort is normal.      Breath sounds: Normal breath sounds. Abdominal:      General: Abdomen is flat. Bowel sounds are normal. There is no distension. Palpations: Abdomen is soft. Tenderness: There is no abdominal tenderness. Musculoskeletal:      Cervical back: Neck supple. Comments: Ganglion cyst BL dorsal aspect of hands   Skin:     General: Skin is warm and dry. Neurological:      Mental Status: She is alert and oriented to person, place, and time. Psychiatric:         Mood and Affect: Mood normal.         Behavior: Behavior normal.         Thought Content: Thought content normal.       /78   Pulse 84   Wt 142 lb 3.2 oz (64.5 kg)   SpO2 96%   BMI 24.41 kg/m²     Assessment:      1. Anxiety and depression  - Pt notes her anxiety has been worse since her brother in law passed away from accident. I recommend trial of hydroxyzine for anxiety when needed, may cause drowsiness. Recommend seeing therapist as well, will refer. - continue wellbutrin for depression. Pt has good support system at home. - [address] [facility]    2.  Rheumatoid arthritis, involving unspecified site, unspecified whether rheumatoid factor present [facility])  - following with Dr. Raul Morales, she has MRI coming up of her wrist. She is currently not on any medications at the moment for RA. 3. SVT (supraventricular tachycardia) (HCC)  Hx of ablation past, has been doing well and stable. Follows with cardiology yearly. 4. Dysautonomia orthostatic hypotension syndrome  - follows with cardiology yearly. Has been stable. Plan:      Return in about 6 weeks (around 10/27/2021) for follow up. Orders Placed This Encounter   Procedures   [address]     Referral Priority:   Routine     Referral Type:   Eval and Treat     Referral Reason:   Specialty Services Required     Referred to Provider:   AKILAH Senior     Requested Specialty:   Behavioral Health     Number of Visits Requested:   1     Orders Placed This Encounter   Medications    hydrOXYzine (ATARAX) 25 MG tablet     Sig: Take 1 tablet by mouth every 8 hours as needed for Itching     Dispense:  30 tablet     Refill:  0        Patient given educational materials - see patient instructions. Discussed use, benefit, and side effects of prescribed medications. All patient questions answered. Pt voiced understanding. Reviewed healthmaintenance. Instructed to continue current medications, diet and exercise. Patient agreed with treatment plan. Follow up as directed.      Electronically signed by Hien Toney DO on 9/15/2021 at 2:36 PM

## 2021-09-17 ENCOUNTER — CLINICAL DOCUMENTATION (OUTPATIENT)
Dept: BEHAVIORAL/MENTAL HEALTH CLINIC | Age: 31
End: 2021-09-17

## 2021-09-17 NOTE — PROGRESS NOTES
Warm handoff requested by Gigi Bedoya DO and was completed. Patient scheduled for future face to face visit. on Oct 8th.

## 2021-10-08 ENCOUNTER — OFFICE VISIT (OUTPATIENT)
Dept: BEHAVIORAL/MENTAL HEALTH CLINIC | Age: 31
End: 2021-10-08
Payer: MEDICAID

## 2021-10-08 DIAGNOSIS — F43.23 ADJUSTMENT DISORDER WITH MIXED ANXIETY AND DEPRESSED MOOD: Primary | ICD-10-CM

## 2021-10-08 PROCEDURE — 4004F PT TOBACCO SCREEN RCVD TLK: CPT | Performed by: SOCIAL WORKER

## 2021-10-08 PROCEDURE — 90791 PSYCH DIAGNOSTIC EVALUATION: CPT | Performed by: SOCIAL WORKER
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
## 2021-10-22 ENCOUNTER — OFFICE VISIT (OUTPATIENT)
Dept: BEHAVIORAL/MENTAL HEALTH CLINIC | Age: 31
End: 2021-10-22
Payer: MEDICAID

## 2021-10-22 DIAGNOSIS — F43.23 ADJUSTMENT DISORDER WITH MIXED ANXIETY AND DEPRESSED MOOD: Primary | ICD-10-CM

## 2021-10-22 PROCEDURE — 4004F PT TOBACCO SCREEN RCVD TLK: CPT | Performed by: SOCIAL WORKER

## 2021-10-22 PROCEDURE — 90834 PSYTX W PT 45 MINUTES: CPT | Performed by: SOCIAL WORKER

## 2021-10-22 NOTE — PROGRESS NOTES
BEHAVIORAL HEALTH FOLLOW UP  SALUD Day  Behavioral Health Consultant      Visit Date: 10/22/2021   Time of appointment:  11am   Time spent with Patient: 45 minutes. This is patient's second appointment. Pt and/or guardian was educated on the model of service to include a short-term intervention focused approach and as well as the limits of confidentiality (i.e. abuse reporting, suicide intervention, etc.). Pt and/or guardian indicated understanding. Pt and/or guardian provided informed consent for the behavioral health program.  Visit completed in person. Patient presented alone. Patient Location: [facility], [facility]  Provider Location: [facility], [facility]    PCP: Kesha Crum DO      Reason for Consult: Follow-up      SUBJECTIVE  Emmie Esquivel is a 27 y.o. female who presents for follow up of anxiety, stress and grief. She reports self regulation strategies haven't had a great affect. States that last week they were at the ER twice due to nephew and then fiance having medical emergencies. Fibecky is not doing very well and this has of course significantly increased the stress pt was already experiencing with the grief over her brother in law and helping out her sister. OBJECTIVE  Affective and emotional state appeared anxious. Suicidal thoughts or behaviors not present  Homicidal thoughts or behaviors not present  Hygiene was good   Dress was appropriate  Behavior was Calm and engaged  Attitude was Cooperative. Eye-contact was good . Speech is described as normal rate, normal volume and well articulated  Thought processes were logical without evidence of delusions, hallucinations, obsessions, or myrna  Pt was oriented to person, place, time, and general circumstances with recent memory:  good and remote memory:  good.   Insight and judgment are estimated to be good     PHQ Scores 9/29/2021 3/16/2021 12/20/2019   PHQ2 Score 2 2 4   PHQ9 Score 2 2 11 Interpretation of Total Score Depression Severity: 1-4 = Minimal depression, 5-9 = Mild depression, 10-14 = Moderate depression, 15-19 = Moderately severe depression, 20-27 = Severe depression      ASSESSMENT  Ricardo Patel presented to the appointment today for follow up and treatment of anxiety, stress and grief. She is currently deemed no risk to self or others. The main focus or themes of the visit today included stress management. Rocio Britton continues to gain greater mastery over distressing symptoms. She would benefit from further behavioral health consultation to address anxiety, stress, grief. Rocio Britton was in agreement with recommendations. DIAGNOSIS  Rocio Britton was seen today for follow-up. Diagnoses and all orders for this visit:    Adjustment disorder with mixed anxiety and depressed mood        INTERVENTION  Therapeutic strategies included development of self-care mindset and commitment to self-care behaviors and exploring and encouraging use of practices that can support symptom management and personal growth in daily life . PLAN  Pt will utilize helpers in the family in order to get some time for self-care  Follow up in 2 weeks with [facility]  Is interactive complexity present?  No  Reason:  N/A  Additional Supporting Information:  N/A       Electronically signed by Jennifer Mcclain on 10/22/2021 at 2:21 PM

## 2021-10-27 ENCOUNTER — OFFICE VISIT (OUTPATIENT)
Dept: PRIMARY CARE CLINIC | Age: 31
End: 2021-10-27
Payer: MEDICAID

## 2021-10-27 ENCOUNTER — OFFICE VISIT (OUTPATIENT)
Dept: OBGYN CLINIC | Age: 31
End: 2021-10-27
Payer: MEDICAID

## 2021-10-27 VITALS
DIASTOLIC BLOOD PRESSURE: 72 MMHG | SYSTOLIC BLOOD PRESSURE: 120 MMHG | HEIGHT: 64 IN | WEIGHT: 143 LBS | RESPIRATION RATE: 17 BRPM | BODY MASS INDEX: 24.41 KG/M2 | HEART RATE: 113 BPM | OXYGEN SATURATION: 98 %

## 2021-10-27 VITALS
DIASTOLIC BLOOD PRESSURE: 64 MMHG | SYSTOLIC BLOOD PRESSURE: 114 MMHG | RESPIRATION RATE: 16 BRPM | BODY MASS INDEX: 24.46 KG/M2 | WEIGHT: 142.5 LBS

## 2021-10-27 DIAGNOSIS — F32.A ANXIETY AND DEPRESSION: Primary | ICD-10-CM

## 2021-10-27 DIAGNOSIS — M06.9 RHEUMATOID ARTHRITIS, INVOLVING UNSPECIFIED SITE, UNSPECIFIED WHETHER RHEUMATOID FACTOR PRESENT (HCC): ICD-10-CM

## 2021-10-27 DIAGNOSIS — R68.82 LOW LIBIDO: Primary | ICD-10-CM

## 2021-10-27 DIAGNOSIS — F41.9 ANXIETY AND DEPRESSION: Primary | ICD-10-CM

## 2021-10-27 PROCEDURE — 99213 OFFICE O/P EST LOW 20 MIN: CPT | Performed by: OBSTETRICS & GYNECOLOGY

## 2021-10-27 PROCEDURE — G8420 CALC BMI NORM PARAMETERS: HCPCS | Performed by: FAMILY MEDICINE

## 2021-10-27 PROCEDURE — G8428 CUR MEDS NOT DOCUMENT: HCPCS | Performed by: OBSTETRICS & GYNECOLOGY

## 2021-10-27 PROCEDURE — G8484 FLU IMMUNIZE NO ADMIN: HCPCS | Performed by: OBSTETRICS & GYNECOLOGY

## 2021-10-27 PROCEDURE — G8420 CALC BMI NORM PARAMETERS: HCPCS | Performed by: OBSTETRICS & GYNECOLOGY

## 2021-10-27 PROCEDURE — G8484 FLU IMMUNIZE NO ADMIN: HCPCS | Performed by: FAMILY MEDICINE

## 2021-10-27 PROCEDURE — 4004F PT TOBACCO SCREEN RCVD TLK: CPT | Performed by: FAMILY MEDICINE

## 2021-10-27 PROCEDURE — G8427 DOCREV CUR MEDS BY ELIG CLIN: HCPCS | Performed by: FAMILY MEDICINE

## 2021-10-27 PROCEDURE — 4004F PT TOBACCO SCREEN RCVD TLK: CPT | Performed by: OBSTETRICS & GYNECOLOGY

## 2021-10-27 PROCEDURE — 99213 OFFICE O/P EST LOW 20 MIN: CPT | Performed by: FAMILY MEDICINE

## 2021-10-27 RX ORDER — BUSPIRONE HYDROCHLORIDE 10 MG/1
10 TABLET ORAL 3 TIMES DAILY
COMMUNITY
End: 2021-10-27

## 2021-10-27 RX ORDER — SULFASALAZINE 500 MG/1
500 TABLET ORAL 4 TIMES DAILY
COMMUNITY
Start: 2021-09-28

## 2021-10-27 RX ORDER — BUSPIRONE HYDROCHLORIDE 10 MG/1
10 TABLET ORAL 3 TIMES DAILY
Qty: 90 TABLET | Refills: 2 | Status: SHIPPED | OUTPATIENT
Start: 2021-10-27 | End: 2021-11-26

## 2021-10-27 RX ORDER — HYDROXYCHLOROQUINE SULFATE 200 MG/1
200 TABLET, FILM COATED ORAL 2 TIMES DAILY
COMMUNITY
Start: 2021-09-28

## 2021-10-27 RX ORDER — HYDROXYZINE HYDROCHLORIDE 25 MG/1
25 TABLET, FILM COATED ORAL 3 TIMES DAILY PRN
COMMUNITY
End: 2022-08-15

## 2021-10-27 ASSESSMENT — ENCOUNTER SYMPTOMS
SHORTNESS OF BREATH: 0
NAUSEA: 0
VOMITING: 0

## 2021-10-27 ASSESSMENT — PATIENT HEALTH QUESTIONNAIRE - PHQ9
SUM OF ALL RESPONSES TO PHQ QUESTIONS 1-9: 10
4. FEELING TIRED OR HAVING LITTLE ENERGY: 3
10. IF YOU CHECKED OFF ANY PROBLEMS, HOW DIFFICULT HAVE THESE PROBLEMS MADE IT FOR YOU TO DO YOUR WORK, TAKE CARE OF THINGS AT HOME, OR GET ALONG WITH OTHER PEOPLE: 1
SUM OF ALL RESPONSES TO PHQ QUESTIONS 1-9: 10
7. TROUBLE CONCENTRATING ON THINGS, SUCH AS READING THE NEWSPAPER OR WATCHING TELEVISION: 0
SUM OF ALL RESPONSES TO PHQ9 QUESTIONS 1 & 2: 4
1. LITTLE INTEREST OR PLEASURE IN DOING THINGS: 2
5. POOR APPETITE OR OVEREATING: 0
9. THOUGHTS THAT YOU WOULD BE BETTER OFF DEAD, OR OF HURTING YOURSELF: 0
2. FEELING DOWN, DEPRESSED OR HOPELESS: 2
SUM OF ALL RESPONSES TO PHQ QUESTIONS 1-9: 10
8. MOVING OR SPEAKING SO SLOWLY THAT OTHER PEOPLE COULD HAVE NOTICED. OR THE OPPOSITE, BEING SO FIGETY OR RESTLESS THAT YOU HAVE BEEN MOVING AROUND A LOT MORE THAN USUAL: 0
3. TROUBLE FALLING OR STAYING ASLEEP: 3
6. FEELING BAD ABOUT YOURSELF - OR THAT YOU ARE A FAILURE OR HAVE LET YOURSELF OR YOUR FAMILY DOWN: 0

## 2021-10-27 NOTE — PROGRESS NOTES
[facility] PRIMARY CARE  [facility] [address] [facility] [address]  [address] [address]  Dept: [phone]  Dept Fax: [phone]    Shahana Del Rosario is a 32 y.o. female who presents today for her medical conditions/complaints as noted below. Shahana Del Rosario is c/o of  Chief Complaint   Patient presents with    Anxiety     improved         HPI:     HPI     Pt here for anxiety follow up    Feels like there has been some improvement with her anxiety. She has been going to therapy and that has been helpful. She tried hydroxyzine which she feels did not make a difference. She is also following up with rheumatology for her rheumatoid arthritis and was started on hydroxychloroquine and sulfasalazine a few weeks ago. she has follow-up labs coming up.       Hemoglobin A1C (%)   Date Value   2020 5.6             ( goal A1C is < 7)   No results found for: LABMICR  LDL Cholesterol (mg/dL)   Date Value   2020 89       (goal LDL is <100)   AST (U/L)   Date Value   2020 16     ALT (U/L)   Date Value   2020 12     BUN (mg/dL)   Date Value   2020 10     BP Readings from Last 3 Encounters:   10/27/21 120/72   09/15/21 118/78   21 110/62          (goal 120/80)    Past Medical History:   Diagnosis Date    Rheumatoid arthritis ([facility]     no medications    SVT (supraventricular tachycardia) ([facility] 2014      Past Surgical History:   Procedure Laterality Date    ABLATION OF DYSRHYTHMIC FOCUS      cryoablation svt    COLPOSCOPY  2018    LGSIL    INTRAUTERINE DEVICE INSERTION  2017    Paraguard, later removed    IN COLPOSCOPY,CERVIX W/ADJ VAG,W/LOOP BX N/A 2018    LEEP W/TOP HAT & COLPOSCOPY W/DEPO performed by Wilian Marroquin DO at [id number]       Family History   Problem Relation Age of Onset    High Blood Pressure Father     COPD Maternal Grandfather     Cancer Paternal Grandmother         bone       Social History     Tobacco Use    Smoking status: Current Every Day Smoker     Packs/day: 0.50     Types: Cigarettes    Smokeless tobacco: Never Used   Substance Use Topics    Alcohol use: No      Current Outpatient Medications   Medication Sig Dispense Refill    hydroxychloroquine (PLAQUENIL) 200 MG tablet Take 200 mg by mouth 2 times daily      sulfaSALAzine (AZULFIDINE) 500 MG tablet Take 500 mg by mouth 4 times daily      hydrOXYzine (ATARAX) 25 MG tablet Take 25 mg by mouth 3 times daily as needed      busPIRone (BUSPAR) 10 MG tablet Take 1 tablet by mouth 3 times daily 90 tablet 2    buPROPion (WELLBUTRIN XL) 150 MG extended release tablet Take 1 tablet by mouth every morning 90 tablet 0    medroxyPROGESTERone (DEPO-PROVERA) 150 MG/ML injection inject 1 ml intramuscularly once for 1 dose 1 mL 3     No current facility-administered medications for this visit. No Known Allergies    Health Maintenance   Topic Date Due    Hepatitis C screen  Never done    Varicella vaccine (1 of 2 - 2-dose childhood series) Never done    Pneumococcal 0-64 years Vaccine (1 of 2 - PPSV23) Never done    COVID-19 Vaccine (1) Never done    HIV screen  Never done    Flu vaccine (1) 09/15/2022 (Originally 9/1/2021)    DTaP/Tdap/Td vaccine (2 - Td or Tdap) 05/13/2023    Cervical cancer screen  07/29/2026    Hepatitis B vaccine  Completed    Hepatitis A vaccine  Aged Out    Hib vaccine  Aged Out    Meningococcal (ACWY) vaccine  Aged Out       Subjective:      Review of Systems   Constitutional: Negative for chills and fever. Respiratory: Negative for shortness of breath. Cardiovascular: Negative for chest pain. Gastrointestinal: Negative for nausea and vomiting. Psychiatric/Behavioral: The patient is nervous/anxious. Objective:     Physical Exam  Constitutional:       Appearance: She is well-developed. HENT:      Head: Normocephalic and atraumatic.       Right Ear: External ear normal.      Left Ear: External ear normal.   Eyes: Conjunctiva/sclera: Conjunctivae normal.      Pupils: Pupils are equal, round, and reactive to light. Cardiovascular:      Rate and Rhythm: Normal rate and regular rhythm. Heart sounds: Normal heart sounds. Pulmonary:      Effort: Pulmonary effort is normal.      Breath sounds: Normal breath sounds. Musculoskeletal:      Cervical back: Neck supple. Skin:     General: Skin is warm and dry. Neurological:      Mental Status: She is alert and oriented to person, place, and time. Psychiatric:         Behavior: Behavior normal.         Thought Content: Thought content normal.       /72   Pulse 113   Resp 17   Ht 5' 4\" (1.626 m)   Wt 143 lb (64.9 kg)   SpO2 98%   BMI 24.55 kg/m²     Assessment:      1. Anxiety and depression  - some improvement with therapy. Continue therapy and wellbutrin, increase buspar to 10 mg tid. 2. Rheumatoid arthritis, involving unspecified site, unspecified whether rheumatoid factor present ([facility]  - started new medications few weeks ago, following with rheumatology. Plan:      Return in about 2 months (around 12/27/2021) for follow up. No orders of the defined types were placed in this encounter. Orders Placed This Encounter   Medications    busPIRone (BUSPAR) 10 MG tablet     Sig: Take 1 tablet by mouth 3 times daily     Dispense:  90 tablet     Refill:  2        Patient given educational materials - see patient instructions. Discussed use, benefit, and side effects of prescribed medications. All patient questions answered. Pt voiced understanding. Reviewed healthmaintenance. Instructed to continue current medications, diet and exercise. Patient agreed with treatment plan. Follow up as directed.      Electronically signed by Kesha Crum DO on 10/27/2021 at 12:31 PM
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
## 2021-11-01 ENCOUNTER — PATIENT MESSAGE (OUTPATIENT)
Dept: OBGYN CLINIC | Age: 31
End: 2021-11-01

## 2021-11-01 NOTE — TELEPHONE ENCOUNTER
From: Steven Waldron  To: Samreen Mills DO  Sent: 11/1/2021 3:07 PM EDT  Subject: Visit Follow-Up Question    After talking it over with my boyfriend I think we're going to stop the depo and try the vaginal ring out. I did have a couple questions about it,   Since I'm coming off of the depo and not having cycles, do I insert right away or after I have a cycle? It also said to keep in for 21 days and take out for 7, are the 7 days it's removed supposed to be the days of your cycle?      Thanks,  Mar Small

## 2021-11-02 RX ORDER — SEGESTERONE ACETATE AND ETHINYL ESTRADIOL 103; 17.4 MG/1; MG/1
RING VAGINAL
Qty: 1 EACH | Refills: 0 | Status: SHIPPED | OUTPATIENT
Start: 2021-11-02 | End: 2021-12-27

## 2021-11-05 ENCOUNTER — OFFICE VISIT (OUTPATIENT)
Dept: BEHAVIORAL/MENTAL HEALTH CLINIC | Age: 31
End: 2021-11-05
Payer: MEDICAID

## 2021-11-05 DIAGNOSIS — F43.23 ADJUSTMENT DISORDER WITH MIXED ANXIETY AND DEPRESSED MOOD: Primary | ICD-10-CM

## 2021-11-05 PROCEDURE — 90834 PSYTX W PT 45 MINUTES: CPT | Performed by: SOCIAL WORKER

## 2021-11-05 PROCEDURE — 4004F PT TOBACCO SCREEN RCVD TLK: CPT | Performed by: SOCIAL WORKER

## 2021-11-16 RX ORDER — ETONOGESTREL AND ETHINYL ESTRADIOL 11.7; 2.7 MG/1; MG/1
1 INSERT, EXTENDED RELEASE VAGINAL
Qty: 3 EACH | Refills: 3 | Status: SHIPPED | OUTPATIENT
Start: 2021-11-16 | End: 2022-05-03

## 2021-11-16 NOTE — PROGRESS NOTES
BEHAVIORAL HEALTH FOLLOW UP  SALUD Howe  Behavioral Health Consultant      Visit Date: 11/5/2021   Time of appointment:  11am   Time spent with Patient: 45 minutes. This is patient's third appointment. Pt and/or guardian was educated on the model of service to include a short-term intervention focused approach and as well as the limits of confidentiality (i.e. abuse reporting, suicide intervention, etc.). Pt and/or guardian indicated understanding. Pt and/or guardian provided informed consent for the behavioral health program.  Visit completed in person. Patient presented alone. Patient Location: [facility], [address] [place] [address]   Provider Location: [facility] office, [address] [place] [address]     PCP: Robyn Carlson DO      Reason for Consult: Follow-up      MARYAM Perez is a 32 y.o. female who presents for follow up of anxiety, stress and grief. She reports some improvement in getting time available for self-care. Discusses difficulties in family dynamics with parents that impact how she manages stress. OBJECTIVE  Affective and emotional state appeared calm. Suicidal thoughts or behaviors not present  Homicidal thoughts or behaviors not present  Hygiene was good   Dress was appropriate  Behavior was Calm and engaged  Attitude was Cooperative. Eye-contact was good . Speech is described as normal rate, normal volume and well articulated  Thought processes were logical without evidence of delusions, hallucinations, obsessions, or myrna  Pt was oriented to person, place, time, and general circumstances with recent memory:  good and remote memory:  good.   Insight and judgment are estimated to be good     PHQ Scores 10/29/2021 10/27/2021 9/29/2021 3/16/2021 12/20/2019   PHQ2 Score 1 4 2 2 4   PHQ9 Score 1 10 2 2 11     Interpretation of Total Score Depression Severity: 1-4 = Minimal depression, 5-9 = Mild depression, 10-14 = Moderate depression, 15-19 = Moderately severe depression, 20-27 = Severe depression    ASSESSMENT  Larisa Osuna presented to the appointment today for follow up and treatment of anxiety, stress and grief. She is currently deemed no risk to self or others. The main focus or themes of the visit today included coping with difficult or intense emotions, stress management and coping with relationship difficulties. Tico Coleman is making progress with current treatment. She would benefit from further behavioral health consultation to address grief, stress, anxiety. Tico Coleman was in agreement with recommendations. DIAGNOSIS  Tico Coleman was seen today for follow-up. Diagnoses and all orders for this visit:    Adjustment disorder with mixed anxiety and depressed mood        INTERVENTION  Therapeutic strategies included exploring and encouraging use of practices that can support symptom management and personal growth in daily life , therapeutic feedback regarding development and progress, review of work done by patient between visits, training in communication and interpersonal skills and exploring and encouraging use of natural social supports. PLAN  Pt will practice different boundaries with parents, make sure of time available for self care  Follow up in 2 weeks with [facility]  Is interactive complexity present?  No  Reason:  N/A  Additional Supporting Information:  N/A       Electronically signed by Kathryn Brennan on 11/16/2021 at 8:51 AM

## 2021-11-19 ENCOUNTER — OFFICE VISIT (OUTPATIENT)
Dept: BEHAVIORAL/MENTAL HEALTH CLINIC | Age: 31
End: 2021-11-19
Payer: MEDICAID

## 2021-11-19 DIAGNOSIS — F43.23 ADJUSTMENT DISORDER WITH MIXED ANXIETY AND DEPRESSED MOOD: Primary | ICD-10-CM

## 2021-11-19 PROCEDURE — 90837 PSYTX W PT 60 MINUTES: CPT | Performed by: SOCIAL WORKER

## 2021-11-19 PROCEDURE — 4004F PT TOBACCO SCREEN RCVD TLK: CPT | Performed by: SOCIAL WORKER

## 2021-12-01 NOTE — PROGRESS NOTES
BEHAVIORAL HEALTH FOLLOW UP  SALUD Urrutia  Behavioral Health Consultant      Visit Date: 11/19/2021   Time of appointment:  2pm   Time spent with Patient: 55 minutes. This is patient's fourth appointment. Pt and/or guardian was educated on the model of service to include a short-term intervention focused approach and as well as the limits of confidentiality (i.e. abuse reporting, suicide intervention, etc.). Pt and/or guardian indicated understanding. Pt and/or guardian provided informed consent for the behavioral health program.  Visit completed in person. Patient presented alone. Patient Location: [facility], [facility]  Provider Location: [facility], [facility]    PCP: Allan Greer DO      Reason for Consult: Follow-up      SUBJECTIVE  Lindsey Sands is a 32 y.o. female who presents for follow up of anxiety, stress and grief. She reports symptoms improving., She reports having some more time to herself and is feeling more comfortable with this. Pt discusses today the impact of parents, particularly mother, on her life and symptoms. OBJECTIVE  Affective and emotional state appeared calm and sad. Suicidal thoughts or behaviors not present  Homicidal thoughts or behaviors not present  Hygiene was good   Dress was appropriate  Behavior was Calm and engaged  Attitude was Cooperative. Eye-contact was good . Speech is described as normal rate, normal volume and well articulated  Thought processes were logical without evidence of delusions, hallucinations, obsessions, or myrna  Pt was oriented to person, place, time, and general circumstances with recent memory:  good and remote memory:  good.   Insight and judgment are estimated to be good     PHQ Scores 11/30/2021 10/29/2021 10/27/2021 9/29/2021 3/16/2021 12/20/2019   PHQ2 Score 4 1 4 2 2 4   PHQ9 Score 11 1 10 2 2 11     Interpretation of Total Score Depression Severity: 1-4 = Minimal depression, 5-9 = Mild depression, 10-14 = Moderate depression, 15-19 = Moderately severe depression, 20-27 = Severe depression    No flowsheet data found. Interpretation of LAKESHIA-7 score: 5-9 = mile anxiety, 10-14 - moderate anxiety, 15+ = severe anxiety. Recommend referral to behavioral health for scores 10 or greater. ASSESSMENT  Corinna Neves presented to the appointment today for follow up and treatment of anxiety, stress and grief. She is currently deemed no risk to self or others. The main focus or themes of the visit today included improvement in communication skills, stress management and coping with relationship difficulties. Nneka Pathak is making progress with current treatment and continues to work in improvement of relational functioning. She would benefit from further behavioral health consultation to address stress, anxiety. Nneka Pathak was in agreement with recommendations. DIAGNOSIS  Nneka Pathak was seen today for follow-up. Diagnoses and all orders for this visit:    Adjustment disorder with mixed anxiety and depressed mood        INTERVENTION  Therapeutic strategies included therapeutic feedback regarding development and progress, review of work done by patient between visits, enhancing capacity for more effective problem solving and coping skills in daily life and training in communication and interpersonal skills. PLAN  Follow up in 3 weeks with [address] [place] [address]  Is interactive complexity present?  No  Reason:  N/A  Additional Supporting Information:  N/A       Electronically signed by Risa Meyer on 12/1/2021 at 10:45 AM

## 2021-12-13 ENCOUNTER — OFFICE VISIT (OUTPATIENT)
Dept: BEHAVIORAL/MENTAL HEALTH CLINIC | Age: 31
End: 2021-12-13
Payer: MEDICAID

## 2021-12-13 DIAGNOSIS — F43.23 ADJUSTMENT DISORDER WITH MIXED ANXIETY AND DEPRESSED MOOD: Primary | ICD-10-CM

## 2021-12-13 PROCEDURE — 4004F PT TOBACCO SCREEN RCVD TLK: CPT | Performed by: SOCIAL WORKER

## 2021-12-13 PROCEDURE — 90834 PSYTX W PT 45 MINUTES: CPT | Performed by: SOCIAL WORKER

## 2021-12-27 ENCOUNTER — OFFICE VISIT (OUTPATIENT)
Dept: PRIMARY CARE CLINIC | Age: 31
End: 2021-12-27
Payer: MEDICAID

## 2021-12-27 VITALS — SYSTOLIC BLOOD PRESSURE: 118 MMHG | HEART RATE: 101 BPM | OXYGEN SATURATION: 98 % | DIASTOLIC BLOOD PRESSURE: 80 MMHG

## 2021-12-27 DIAGNOSIS — F41.9 ANXIETY AND DEPRESSION: Primary | ICD-10-CM

## 2021-12-27 DIAGNOSIS — F32.A ANXIETY AND DEPRESSION: Primary | ICD-10-CM

## 2021-12-27 DIAGNOSIS — M06.9 RHEUMATOID ARTHRITIS, INVOLVING UNSPECIFIED SITE, UNSPECIFIED WHETHER RHEUMATOID FACTOR PRESENT (HCC): ICD-10-CM

## 2021-12-27 PROCEDURE — 99213 OFFICE O/P EST LOW 20 MIN: CPT | Performed by: FAMILY MEDICINE

## 2021-12-27 PROCEDURE — G8484 FLU IMMUNIZE NO ADMIN: HCPCS | Performed by: FAMILY MEDICINE

## 2021-12-27 PROCEDURE — G8427 DOCREV CUR MEDS BY ELIG CLIN: HCPCS | Performed by: FAMILY MEDICINE

## 2021-12-27 PROCEDURE — 4004F PT TOBACCO SCREEN RCVD TLK: CPT | Performed by: FAMILY MEDICINE

## 2021-12-27 PROCEDURE — G8420 CALC BMI NORM PARAMETERS: HCPCS | Performed by: FAMILY MEDICINE

## 2021-12-27 ASSESSMENT — ENCOUNTER SYMPTOMS
VOMITING: 0
NAUSEA: 0
SHORTNESS OF BREATH: 0

## 2021-12-27 NOTE — PROGRESS NOTES
[address] PRIMARY CARE  [address]  [address] [address]  Dept: [phone]  Dept Fax: [phone]    Grady Santa is a 32 y.o. female who presents today for her medical conditions/complaints as noted below.   Grady Santa is c/o of  Chief Complaint   Patient presents with   [facility] Maintenance     2mo f/u, has had improvement with increase in meds         HPI:     HPI     Pt here today for follow up regarding anixety/depression    She feels has improvement with increase in her medications    Seems to feeling better with medication for rheumatoid arthritis-  Following up jan7th with rheumatologist.         Hemoglobin A1C (%)   Date Value   09/04/2020 5.6             ( goal A1C is < 7)   No results found for: LABMICR  LDL Cholesterol (mg/dL)   Date Value   09/04/2020 89       (goal LDL is <100)   AST (U/L)   Date Value   09/04/2020 16     ALT (U/L)   Date Value   09/04/2020 12     BUN (mg/dL)   Date Value   09/04/2020 10     BP Readings from Last 3 Encounters:   12/27/21 118/80   10/27/21 114/64   10/27/21 120/72          (goal 120/80)    Past Medical History:   Diagnosis Date    Rheumatoid arthritis ([facility]     no medications    SVT (supraventricular tachycardia) ([facility] 7/1/2014      Past Surgical History:   Procedure Laterality Date    ABLATION OF DYSRHYTHMIC FOCUS  2008    cryoablation svt    COLPOSCOPY  07/09/2018    LGSIL    INTRAUTERINE DEVICE INSERTION  03/2017    Paraguard, later removed    AZ COLPOSCOPY,CERVIX W/ADJ VAG,W/LOOP BX N/A 7/31/2018    LEEP W/TOP HAT & COLPOSCOPY W/DEPO performed by Luisa Andrade DO at [address]       Family History   Problem Relation Age of Onset    High Blood Pressure Father     COPD Maternal Grandfather     Cancer Paternal Grandmother         bone       Social History     Tobacco Use    Smoking status: Current Every Day Smoker     Packs/day: 0.50     Types: Cigarettes    Smokeless tobacco: Never Used Substance Use Topics    Alcohol use: No      Current Outpatient Medications   Medication Sig Dispense Refill    buPROPion (WELLBUTRIN XL) 150 MG extended release tablet Take 1 tablet by mouth every morning 90 tablet 3    etonogestrel-ethinyl estradiol (NUVARING) 0.12-0.015 MG/24HR vaginal ring Place 1 each vaginally every 21 days Insert one (1) ring vaginally and leave in place for three (3) weeks, then remove for one (1) week. 3 each 3    hydroxychloroquine (PLAQUENIL) 200 MG tablet Take 200 mg by mouth 2 times daily      sulfaSALAzine (AZULFIDINE) 500 MG tablet Take 500 mg by mouth 4 times daily      hydrOXYzine (ATARAX) 25 MG tablet Take 25 mg by mouth 3 times daily as needed       No current facility-administered medications for this visit. No Known Allergies    Health Maintenance   Topic Date Due    Hepatitis C screen  Never done    Varicella vaccine (1 of 2 - 2-dose childhood series) Never done    COVID-19 Vaccine (1) Never done    Pneumococcal 0-64 years Vaccine (1 of 2 - PPSV23) Never done    HIV screen  Never done    Flu vaccine (1) 09/15/2022 (Originally 9/1/2021)    DTaP/Tdap/Td vaccine (2 - Td or Tdap) 05/13/2023    Cervical cancer screen  07/29/2026    Hepatitis B vaccine  Completed    Hepatitis A vaccine  Aged Out    Hib vaccine  Aged Out    Meningococcal (ACWY) vaccine  Aged Out       Subjective:      Review of Systems   Constitutional: Negative for chills and fever. Respiratory: Negative for shortness of breath. Cardiovascular: Negative for chest pain and palpitations. Gastrointestinal: Negative for nausea and vomiting. Psychiatric/Behavioral:        Anxiety and depression improving       Objective:     Physical Exam  Constitutional:       Appearance: She is well-developed. HENT:      Head: Normocephalic and atraumatic.       Right Ear: External ear normal.      Left Ear: External ear normal.   Eyes:      Conjunctiva/sclera: Conjunctivae normal.      Pupils: Pupils are equal, round, and reactive to light. Cardiovascular:      Rate and Rhythm: Normal rate and regular rhythm. Heart sounds: Normal heart sounds. Pulmonary:      Effort: Pulmonary effort is normal.      Breath sounds: Normal breath sounds. Musculoskeletal:      Cervical back: Neck supple. Skin:     General: Skin is warm and dry. Neurological:      Mental Status: She is alert and oriented to person, place, and time. Psychiatric:         Mood and Affect: Mood normal.         Behavior: Behavior normal.         Thought Content: Thought content normal.       /80   Pulse 101   SpO2 98%     Assessment:      1. Anxiety and depression  - improving on current medications and has been doing therapy as well. Follow up in three months. 2. Rheumatoid arthritis, involving unspecified site, unspecified whether rheumatoid factor present [facility])  - has noted some improvement with medications, has follow up in January with rheumatologist.          Plan:      Return in about 3 months (around 3/27/2022) for physical exam.    No orders of the defined types were placed in this encounter. No orders of the defined types were placed in this encounter. Patient given educational materials - see patient instructions. Discussed use, benefit, and side effects of prescribed medications. All patient questions answered. Pt voiced understanding. Reviewed healthmaintenance. Instructed to continue current medications, diet and exercise. Patient agreed with treatment plan. Follow up as directed.      Electronically signed by Randall Moody DO on 12/27/2021 at 9:51 AM

## 2022-01-07 ENCOUNTER — OFFICE VISIT (OUTPATIENT)
Dept: BEHAVIORAL/MENTAL HEALTH CLINIC | Age: 32
End: 2022-01-07
Payer: MEDICAID

## 2022-01-07 DIAGNOSIS — F43.23 ADJUSTMENT DISORDER WITH MIXED ANXIETY AND DEPRESSED MOOD: Primary | ICD-10-CM

## 2022-01-07 PROCEDURE — 90834 PSYTX W PT 45 MINUTES: CPT | Performed by: SOCIAL WORKER

## 2022-01-07 PROCEDURE — 4004F PT TOBACCO SCREEN RCVD TLK: CPT | Performed by: SOCIAL WORKER

## 2022-01-07 NOTE — PROGRESS NOTES
BEHAVIORAL HEALTH FOLLOW UP  AKILAH Marquez-S  Behavioral Health Consultant      Visit Date: 1/7/2022   Time of appointment:  11am   Time spent with Patient: 45 minutes. This is patient's sixth appointment. Pt and/or guardian was educated on the model of service to include a short-term intervention focused approach and as well as the limits of confidentiality (i.e. abuse reporting, suicide intervention, etc.). Pt and/or guardian indicated understanding. Pt and/or guardian provided informed consent for the behavioral health program.  Visit completed in person. Patient presented alone. Patient Location: [facility], [facility]  Provider Location: [facility], [facility]    PCP: Esther Mcgee DO      Reason for Consult: Follow-up    SUBJECTIVE  Octavia Parsons is a 32 y.o. female who presents for follow up of anxiety, stress and grief. She reports feeling rather overwhelmed and losing energy to keep up with the tasks she has been doing to help her sister. Pt is tearful today. She expresses the desire to reduce her own load but worry if her sister is ready to take things on. OBJECTIVE  Affective and emotional state appeared tearful. Suicidal thoughts or behaviors not present  Homicidal thoughts or behaviors not present  Hygiene was good   Dress was appropriate  Behavior was Calm and engaged  Attitude was Cooperative. Eye-contact was good . Speech is described as normal rate, normal volume and well articulated  Thought processes were logical without evidence of delusions, hallucinations, obsessions, or myrna  Pt was oriented to person, place, time, and general circumstances with recent memory:  good and remote memory:  good.   Insight and judgment are estimated to be good     PHQ Scores 11/30/2021 10/29/2021 10/27/2021 9/29/2021 3/16/2021 12/20/2019   PHQ2 Score 4 1 4 2 2 4   PHQ9 Score 11 1 10 2 2 11     Interpretation of Total Score Depression Severity: 1-4 = Minimal depression, 5-9 = Mild depression, 10-14 = Moderate depression, 15-19 = Moderately severe depression, 20-27 = Severe depression    No flowsheet data found. Interpretation of LAKESHIA-7 score: 5-9 = mile anxiety, 10-14 - moderate anxiety, 15+ = severe anxiety. Recommend referral to behavioral health for scores 10 or greater. ASSESSMENT  Ruchi Robert presented to the appointment today for follow up and treatment of anxiety, stress and grief. She is currently deemed no risk to self or others. The main focus or themes of the visit today included improvement in communication skills, expression and discussion of unhelpful thinking patterns or schemas, reduction in anxiety, worry and panic and stress management. Abelino Gutierrez is making progress with current treatment and continues to gain greater mastery over distressing symptoms. She would benefit from further behavioral health consultation to address anxiety, stress. Abelino Gutierrez was in agreement with recommendations. DIAGNOSIS  Abelino Gutierrez was seen today for follow-up. Diagnoses and all orders for this visit:    Adjustment disorder with mixed anxiety and depressed mood        INTERVENTION  Therapeutic strategies included encouragement of expression of emotion, development of self-care mindset and commitment to self-care behaviors, restructuring of problematic automatic thoughts and beliefs, exploring and encouraging use of practices that can support symptom management and personal growth in daily life , engaging pt in identifying their strengths and resources, therapeutic feedback regarding development and progress, review of work done by patient between visits, enhancing capacity for more effective problem solving and coping skills in daily life and training in communication and interpersonal skills.     PLAN  Pt will begin working with sister on returning daily care of nephews back to sister  [facility]  Is interactive complexity present?  No  Reason:  N/A  Additional Supporting Information:  N/A       Electronically signed by Lucille Williamson on 1/7/2022 at 12:28 PM

## 2022-02-25 ENCOUNTER — OFFICE VISIT (OUTPATIENT)
Dept: BEHAVIORAL/MENTAL HEALTH CLINIC | Age: 32
End: 2022-02-25
Payer: MEDICAID

## 2022-02-25 DIAGNOSIS — F43.23 ADJUSTMENT DISORDER WITH MIXED ANXIETY AND DEPRESSED MOOD: Primary | ICD-10-CM

## 2022-02-25 PROCEDURE — 4004F PT TOBACCO SCREEN RCVD TLK: CPT | Performed by: SOCIAL WORKER

## 2022-02-25 PROCEDURE — 90834 PSYTX W PT 45 MINUTES: CPT | Performed by: SOCIAL WORKER

## 2022-03-01 NOTE — PROGRESS NOTES
BEHAVIORAL HEALTH FOLLOW UP  AdriAKILAH Marinelli-S  Behavioral Health Consultant      Visit Date: 2/25/2022   Time of appointment:  12pm   Time spent with Patient: 50 minutes. This is patient's seventh appointment. Pt and/or guardian was educated on the model of service to include a short-term intervention focused approach and as well as the limits of confidentiality (i.e. abuse reporting, suicide intervention, etc.). Pt and/or guardian indicated understanding. Pt and/or guardian provided informed consent for the behavioral health program.  Visit completed in person. Patient presented alone. Patient Location: [facility], [facility]  Provider Location: [facility], [facility]    PCP: Beryl Baird DO      Reason for Consult: Follow-up      SUBJECTIVE  Elie is a 32 y.o. female who presents for follow up of anxiety, stress and grief. She reports increasing sense of burnout and depression from caring for sister's household and feeling stuck. States she is strongly working on a plan to move with her boyfriend and child out of state as they have previously discussed. OBJECTIVE  Affective and emotional state appeared tearful. Suicidal thoughts or behaviors not present  Homicidal thoughts or behaviors not present  Hygiene was good   Dress was appropriate  Behavior was Calm and engaged  Attitude was Cooperative. Eye-contact was good . Speech is described as normal rate, normal volume and well articulated  Thought processes were logical without evidence of delusions, hallucinations, obsessions, or myrna  Pt was oriented to person, place, time, and general circumstances with recent memory:  good and remote memory:  good.   Insight and judgment are estimated to be good     PHQ Scores 11/30/2021 10/29/2021 10/27/2021 9/29/2021 3/16/2021 12/20/2019   PHQ2 Score 4 1 4 2 2 4   PHQ9 Score 11 1 10 2 2 11     Interpretation of Total Score Depression Severity: 1-4 = Minimal depression, 5-9 = Mild depression, 10-14 = Moderate depression, 15-19 = Moderately severe depression, 20-27 = Severe depression      ASSESSMENT  Glenys Ghotra presented to the appointment today for follow up and treatment of depression, anxiety and stress. She is currently deemed no risk to self or others. The main focus or themes of the visit today included improvement in communication skills, coping with difficult or intense emotions, stress management and coping with relationship difficulties. Saeid Zuñiga is making progress with current treatment and continues to gain greater mastery over distressing symptoms. She would benefit from further behavioral health consultation to address stress, anxiety. Saeid Zuñiga was in agreement with recommendations. DIAGNOSIS  Saeid Zuñiga was seen today for follow-up. Diagnoses and all orders for this visit:    Adjustment disorder with mixed anxiety and depressed mood        INTERVENTION  Therapeutic strategies included development of self-care mindset and commitment to self-care behaviors, enhancing capacity for more effective problem solving and coping skills in daily life and training in communication and interpersonal skills. PLAN  Pt will communicate needs with sister  Follow up in 3 weeks with [facility]  Is interactive complexity present?  No  Reason:  N/A  Additional Supporting Information:  N/A       Electronically signed by Jose Daniel Cleaning on 3/1/2022 at 9:37 AM

## 2022-03-15 ENCOUNTER — OFFICE VISIT (OUTPATIENT)
Dept: BEHAVIORAL/MENTAL HEALTH CLINIC | Age: 32
End: 2022-03-15
Payer: MEDICAID

## 2022-03-15 DIAGNOSIS — F43.23 ADJUSTMENT DISORDER WITH MIXED ANXIETY AND DEPRESSED MOOD: Primary | ICD-10-CM

## 2022-03-15 PROCEDURE — 90837 PSYTX W PT 60 MINUTES: CPT | Performed by: SOCIAL WORKER

## 2022-03-15 PROCEDURE — 4004F PT TOBACCO SCREEN RCVD TLK: CPT | Performed by: SOCIAL WORKER

## 2022-03-15 NOTE — PROGRESS NOTES
BEHAVIORAL HEALTH FOLLOW UP  SALUD Becker  Behavioral Health Consultant      Visit Date: 3/15/2022   Time of appointment:  10am   Time spent with Patient: 55 minutes. This is patient's eighth appointment. Pt and/or guardian was educated on the model of service to include a short-term intervention focused approach and as well as the limits of confidentiality (i.e. abuse reporting, suicide intervention, etc.). Pt and/or guardian indicated understanding. Pt and/or guardian provided informed consent for the behavioral health program.  Visit completed in person. Patient presented alone. Patient Location: [facility], [address] [place] [address]   Provider Location: [facility], [address] [place] [address]     PCP: Cielo Amador DO      Reason for Consult: Follow-up    SUBJECTIVE  Fam Huizar is a 32 y.o. female who presents for follow up of anxiety and stress. She reports mood continues to be somewhat low. Pt noticing this less to do with grief but more to do with ongoing stress in supporting her sister and new developments in how the household of sister/her kids are doing. Pt did create a space for herself at the house to do her crafting which is a positive step for her in giving energy to her own life/interests. Also states she is trying to get dressed each morning instead of staying in lounge clothes. Brings up issues with sister and feeling that way she is talking with sister about the issues isn't producing much change in the problem behaviors. OBJECTIVE  Affective and emotional state appeared sad. Suicidal thoughts or behaviors not present  Homicidal thoughts or behaviors not present  Hygiene was good   Dress was appropriate  Behavior was Calm and engaged  Attitude was Cooperative. Eye-contact was good .   Speech is described as normal rate, normal volume and well articulated  Thought processes were logical without evidence of delusions, hallucinations, obsessions, or myrna  Pt was oriented to person, place, time, and general circumstances with recent memory:  good and remote memory:  good. Insight and judgment are estimated to be good     PHQ Scores 11/30/2021 10/29/2021 10/27/2021 9/29/2021 3/16/2021 12/20/2019   PHQ2 Score 4 1 4 2 2 4   PHQ9 Score 11 1 10 2 2 11     Interpretation of Total Score Depression Severity: 1-4 = Minimal depression, 5-9 = Mild depression, 10-14 = Moderate depression, 15-19 = Moderately severe depression, 20-27 = Severe depression    ASSESSMENT  Glenys Ghotra presented to the appointment today for follow up and treatment of anxiety and stress. She is currently deemed no risk to self or others. The main focus or themes of the visit today included exploration and evaluation of target behaviors for change, stress management and coping with relationship difficulties. Saeid Zuñiga is making progress with current treatment and continues to work in improvement of relational functioning. She would benefit from further behavioral health consultation to address anxiety and stress. Saeid Zuñiga was in agreement with recommendations. DIAGNOSIS  Saeid Zuñiga was seen today for follow-up. Diagnoses and all orders for this visit:    Adjustment disorder with mixed anxiety and depressed mood        INTERVENTION  Therapeutic strategies included encouragement of expression of emotion, development of self-care mindset and commitment to self-care behaviors, exploring and encouraging use of practices that can support symptom management and personal growth in daily life , therapeutic feedback regarding development and progress, review of work done by patient between visits and enhancing capacity for more effective problem solving and coping skills in daily life. PLAN  Pt will further work on boundaries and communication with sister, development of self-care  Follow up in 2 weeks with [facility]  Is interactive complexity present?  No  Reason:  N/A  Additional Supporting Information:  N/A       Electronically signed by Sulema Schlatter on 3/15/2022 at 11:37 AM

## 2022-03-29 ENCOUNTER — OFFICE VISIT (OUTPATIENT)
Dept: BEHAVIORAL/MENTAL HEALTH CLINIC | Age: 32
End: 2022-03-29
Payer: MEDICAID

## 2022-03-29 DIAGNOSIS — F43.23 ADJUSTMENT DISORDER WITH MIXED ANXIETY AND DEPRESSED MOOD: Primary | ICD-10-CM

## 2022-03-29 PROCEDURE — 4004F PT TOBACCO SCREEN RCVD TLK: CPT | Performed by: SOCIAL WORKER

## 2022-03-29 PROCEDURE — 90837 PSYTX W PT 60 MINUTES: CPT | Performed by: SOCIAL WORKER

## 2022-04-01 NOTE — PROGRESS NOTES
[address] [place] [address] Consultant      Visit Date: 3/29/2022   Time of appointment:  12pm   Time spent with Patient: 55 minutes. This is patient's ninth appointment. Pt and/or guardian was educated on the model of service to include a short-term intervention focused approach and as well as the limits of confidentiality (i.e. abuse reporting, suicide intervention, etc.). Pt and/or guardian indicated understanding. Pt and/or guardian provided informed consent for the behavioral health program.  Visit completed in person. Patient presented alone. Patient Location: [facility], [facility]  Provider Location: [facility], [facility]    PCP: Ronnie Marc DO      Reason for Consult: Follow-up      SUBJECTIVE  Allan Hernandez is a 32 y.o. female who presents for follow up of anxiety and stress. She reports more experiences of depression and anger recently with ongoing and worsening stresses in the household (related to sister). Pt is tearful, frustrated and discusses feeling unable to continue to live with sister at this point. OBJECTIVE  Affective and emotional state appeared tearful. Suicidal thoughts or behaviors not present  Homicidal thoughts or behaviors not present  Hygiene was good   Dress was appropriate  Behavior was Calm and engaged  Attitude was Cooperative. Eye-contact was good . Speech is described as normal rate, normal volume and well articulated  Thought processes were logical without evidence of delusions, hallucinations, obsessions, or myrna  Pt was oriented to person, place, time, and general circumstances with recent memory:  good and remote memory:  good.   Insight and judgment are estimated to be good     PHQ Scores 11/30/2021 10/29/2021 10/27/2021 9/29/2021 3/16/2021 12/20/2019   PHQ2 Score 4 1 4 2 2 4   PHQ9 Score 11 1 10 2 2 11     Interpretation of Total Score Depression Severity: 1-4 = Minimal depression, 5-9 = Mild depression, 10-14 = Moderate depression, 15-19 = Moderately severe depression, 20-27 = Severe depression      ASSESSMENT  Dusty Carr presented to the appointment today for follow up and treatment of anxiety and stress. She is currently deemed no risk to self or others. The main focus or themes of the visit today included exploration and evaluation of target behaviors for change, stress management and coping with relationship difficulties. Richard Page is making progress with current treatment. She would benefit from further behavioral health consultation to address anxiety and stress. Richard Page was in agreement with recommendations. DIAGNOSIS  Richard Page was seen today for follow-up. Diagnoses and all orders for this visit:    Adjustment disorder with mixed anxiety and depressed mood        INTERVENTION  Therapeutic strategies included encouragement of expression of emotion, development of self-care mindset and commitment to self-care behaviors, therapeutic feedback regarding development and progress, review of work done by patient between visits, addressing and problem solved identified barriers to change, enhancing capacity for more effective problem solving and coping skills in daily life and exploring and encouraging use of natural social supports. PLAN  Pt will talk with extended family to help develop a plan for her to receive support so that she can eventually move out. Follow up in 2 weeks with [address] [place] [address]  Is interactive complexity present?  No  Reason:  N/A  Additional Supporting Information:  N/A       Electronically signed by Vanita Barger on 4/1/2022 at 3:29 PM

## 2022-04-12 ENCOUNTER — OFFICE VISIT (OUTPATIENT)
Dept: BEHAVIORAL/MENTAL HEALTH CLINIC | Age: 32
End: 2022-04-12
Payer: MEDICAID

## 2022-04-12 DIAGNOSIS — F43.23 ADJUSTMENT DISORDER WITH MIXED ANXIETY AND DEPRESSED MOOD: Primary | ICD-10-CM

## 2022-04-12 PROCEDURE — 4004F PT TOBACCO SCREEN RCVD TLK: CPT | Performed by: SOCIAL WORKER

## 2022-04-12 PROCEDURE — 90834 PSYTX W PT 45 MINUTES: CPT | Performed by: SOCIAL WORKER

## 2022-04-12 NOTE — PROGRESS NOTES
BEHAVIORAL HEALTH FOLLOW UP  Michelle Agosto Consultant      Visit Date: 4/12/2022   Time of appointment:  3pm   Time spent with Patient: 45 minutes. This is patient's tenth appointment. Pt and/or guardian was educated on the model of service to include a short-term intervention focused approach and as well as the limits of confidentiality (i.e. abuse reporting, suicide intervention, etc.). Pt and/or guardian indicated understanding. Pt and/or guardian provided informed consent for the behavioral health program.  Visit completed in person. Patient presented alone. Patient Location: [facility], [facility]  Provider Location: [facility], [facility]    PCP: Gela Hahn,       Reason for Consult: Follow-up      MARYAM Echols is a 32 y.o. female who presents for follow up of anxiety and stress. She reports symptoms improving., She reports setting boundaries with others, \"taking steps back\" from managing sister's household, getting a part time job out of the house for herself and to help with separation of responsbilities. Mood is improving. OBJECTIVE  Affective and emotional state appeared improved. Suicidal thoughts or behaviors not present  Homicidal thoughts or behaviors not present  Hygiene was good   Dress was appropriate  Behavior was Calm and engaged  Attitude was Cooperative. Eye-contact was good . Speech is described as normal rate, normal volume and well articulated  Thought processes were logical without evidence of delusions, hallucinations, obsessions, or myrna  Pt was oriented to person, place, time, and general circumstances with recent memory:  good and remote memory:  good.   Insight and judgment are estimated to be good     PHQ Scores 11/30/2021 10/29/2021 10/27/2021 9/29/2021 3/16/2021 12/20/2019   PHQ2 Score 4 1 4 2 2 4   PHQ9 Score 11 1 10 2 2 11     Interpretation of Total Score Depression Severity: 1-4 = Minimal depression, 5-9 = Mild depression, 10-14 = Moderate depression, 15-19 = Moderately severe depression, 20-27 = Severe depression    No flowsheet data found. Interpretation of LAKESHIA-7 score: 5-9 = mile anxiety, 10-14 - moderate anxiety, 15+ = severe anxiety. Recommend referral to behavioral health for scores 10 or greater. ASSESSMENT  Marco Lorenz presented to the appointment today for follow up and treatment of anxiety and stress. She is currently deemed no risk to self or others. The main focus or themes of the visit today included stress management and coping with relationship difficulties. Jyoti Peña is making progress with current treatment. She would benefit from further behavioral health consultation to address anxiety and stress. Jyoti Peña was in agreement with recommendations. DIAGNOSIS  Jyoti Peña was seen today for follow-up. Diagnoses and all orders for this visit:    Adjustment disorder with mixed anxiety and depressed mood        INTERVENTION  Therapeutic strategies included development of self-care mindset and commitment to self-care behaviors, exploring and encouraging use of practices that can support symptom management and personal growth in daily life , engaging pt in identifying their strengths and resources, identifying exceptions to the identified problem, therapeutic feedback regarding development and progress and review of work done by patient between visits. PLAN  Pt will continue with practicing making and holding boundaries, continue to build up on activities/relationships outside of sister's household needs  Follow up in 3 weeks with [address] [place] [address]  Is interactive complexity present?  No  Reason:  N/A  Additional Supporting Information:  N/A       Electronically signed by Brandon Cabezas on 4/12/2022 at 4:26 PM

## 2022-05-02 ENCOUNTER — TELEPHONE (OUTPATIENT)
Dept: BEHAVIORAL/MENTAL HEALTH CLINIC | Age: 32
End: 2022-05-02

## 2022-05-03 ENCOUNTER — OFFICE VISIT (OUTPATIENT)
Dept: PRIMARY CARE CLINIC | Age: 32
End: 2022-05-03
Payer: MEDICAID

## 2022-05-03 VITALS
BODY MASS INDEX: 22.69 KG/M2 | WEIGHT: 132.2 LBS | DIASTOLIC BLOOD PRESSURE: 74 MMHG | SYSTOLIC BLOOD PRESSURE: 116 MMHG | OXYGEN SATURATION: 98 % | HEART RATE: 119 BPM

## 2022-05-03 DIAGNOSIS — M06.9 RHEUMATOID ARTHRITIS, INVOLVING UNSPECIFIED SITE, UNSPECIFIED WHETHER RHEUMATOID FACTOR PRESENT (HCC): ICD-10-CM

## 2022-05-03 DIAGNOSIS — F41.9 ANXIETY AND DEPRESSION: ICD-10-CM

## 2022-05-03 DIAGNOSIS — F32.A ANXIETY AND DEPRESSION: ICD-10-CM

## 2022-05-03 DIAGNOSIS — Z00.00 HEALTHCARE MAINTENANCE: Primary | ICD-10-CM

## 2022-05-03 PROCEDURE — 99395 PREV VISIT EST AGE 18-39: CPT | Performed by: FAMILY MEDICINE

## 2022-05-03 ASSESSMENT — ENCOUNTER SYMPTOMS
VOMITING: 0
SHORTNESS OF BREATH: 0
NAUSEA: 0

## 2022-05-03 NOTE — PROGRESS NOTES
[facility] PRIMARY CARE  [facility] [address]  [address] [address]  Dept: [phone]  Dept Fax: [phone]    Eusebio Houston is a 32 y.o. female who presents today for her medical conditions/complaints as noted below. Major Shade is c/o of  Chief Complaint   Patient presents with    Annual Exam         HPI:     HPI     Pt here today for yearly physical.     Hx of LEEP,  gets pap regularly. Follows with OBGYN, due for pap later this year. Has been seeing therapy which has been very helpful for her depression. Seeing rheumatology for her rheumatoid arthritis. Doing well on Wellbutrin as well.        Hemoglobin A1C (%)   Date Value   09/04/2020 5.6             ( goal A1C is < 7)   No results found for: LABMICR  LDL Cholesterol (mg/dL)   Date Value   09/04/2020 89       (goal LDL is <100)   AST (U/L)   Date Value   09/04/2020 16     ALT (U/L)   Date Value   09/04/2020 12     BUN (mg/dL)   Date Value   09/04/2020 10     BP Readings from Last 3 Encounters:   05/03/22 116/74   12/27/21 118/80   10/27/21 114/64          (goal 120/80)    Past Medical History:   Diagnosis Date    Rheumatoid arthritis ([facility] [address]     no medications    SVT (supraventricular tachycardia) ([facility] [address] 7/1/2014      Past Surgical History:   Procedure Laterality Date    ABLATION OF DYSRHYTHMIC FOCUS  2008    cryoablation svt    COLPOSCOPY  07/09/2018    LGSIL    INTRAUTERINE DEVICE INSERTION  03/2017    Paraguard, later removed    AZ COLPOSCOPY,CERVIX W/ADJ VAG,W/LOOP BX N/A 7/31/2018    LEEP W/TOP HAT & COLPOSCOPY W/DEPO performed by Flory Paulson DO at [address] History   Problem Relation Age of Onset    High Blood Pressure Father     COPD Maternal Grandfather     Cancer Paternal Grandmother         bone       Social History     Tobacco Use    Smoking status: Current Every Day Smoker     Packs/day: 0.50     Types: Cigarettes    Smokeless tobacco: Never Used   Substance Use Topics    Alcohol use: No      Current Outpatient Medications   Medication Sig Dispense Refill    buPROPion (WELLBUTRIN XL) 150 MG extended release tablet Take 1 tablet by mouth every morning 90 tablet 3    hydroxychloroquine (PLAQUENIL) 200 MG tablet Take 200 mg by mouth 2 times daily      sulfaSALAzine (AZULFIDINE) 500 MG tablet Take 500 mg by mouth 4 times daily      hydrOXYzine (ATARAX) 25 MG tablet Take 25 mg by mouth 3 times daily as needed       No current facility-administered medications for this visit. No Known Allergies    Health Maintenance   Topic Date Due    Varicella vaccine (1 of 2 - 2-dose childhood series) Never done    COVID-19 Vaccine (1) Never done    Pneumococcal 0-64 years Vaccine (1 - PCV) Never done    HIV screen  Never done    Hepatitis C screen  Never done    Flu vaccine (Season Ended) 09/15/2022 (Originally 9/1/2022)    Depression Monitoring  11/30/2022    DTaP/Tdap/Td vaccine (2 - Td or Tdap) 05/13/2023    Cervical cancer screen  07/29/2026    Hepatitis B vaccine  Completed    Hepatitis A vaccine  Aged Out    Hib vaccine  Aged Out    Meningococcal (ACWY) vaccine  Aged Out       Subjective:      Review of Systems   Constitutional: Negative for chills and fever. Respiratory: Negative for shortness of breath. Cardiovascular: Negative for chest pain. Gastrointestinal: Negative for nausea and vomiting. Objective:     Physical Exam  Constitutional:       Appearance: She is well-developed. HENT:      Head: Normocephalic and atraumatic. Right Ear: External ear normal.      Left Ear: External ear normal.   Eyes:      Conjunctiva/sclera: Conjunctivae normal.      Pupils: Pupils are equal, round, and reactive to light. Cardiovascular:      Rate and Rhythm: Normal rate and regular rhythm. Heart sounds: Normal heart sounds. Pulmonary:      Effort: Pulmonary effort is normal.      Breath sounds: Normal breath sounds.    Abdominal:      General: Bowel sounds are normal. There is no distension. Palpations: Abdomen is soft. Tenderness: There is no abdominal tenderness. Musculoskeletal:      Cervical back: Neck supple. Skin:     General: Skin is warm and dry. Neurological:      Mental Status: She is alert and oriented to person, place, and time. Psychiatric:         Mood and Affect: Mood normal.         Behavior: Behavior normal.         Thought Content: Thought content normal.       /74   Pulse 119   Wt 132 lb 3.2 oz (60 kg)   SpO2 98%   BMI 22.69 kg/m²     Assessment:      1. Healthcare maintenance  - recommend continuing healthy diet and exercise. - Glucose, Fasting; Future  - Lipid Panel; Future    2. Rheumatoid arthritis, involving unspecified site, unspecified whether rheumatoid factor present [facility])  - following with rheumatology regularly. 3. Anxiety and depression  - doing well with therapy. Continue wellbutrin. Plan:      Return in about 3 months (around 8/3/2022) for follow up. Orders Placed This Encounter   Procedures    Glucose, Fasting     Standing Status:   Future     Standing Expiration Date:   5/3/2023    Lipid Panel     Standing Status:   Future     Standing Expiration Date:   5/3/2023     Order Specific Question:   Is Patient Fasting?/# of Hours     Answer:   10 hours     No orders of the defined types were placed in this encounter. Patient given educational materials - see patient instructions. Discussed use, benefit, and side effects of prescribed medications. All patient questions answered. Pt voiced understanding. Reviewed healthmaintenance. Instructed to continue current medications, diet and exercise. Patient agreed with treatment plan. Follow up as directed.      Electronically signed by Katharine Varma DO on 5/3/2022 at 2:32 PM

## 2022-05-16 ENCOUNTER — OFFICE VISIT (OUTPATIENT)
Dept: BEHAVIORAL/MENTAL HEALTH CLINIC | Age: 32
End: 2022-05-16
Payer: MEDICAID

## 2022-05-16 DIAGNOSIS — F43.23 ADJUSTMENT DISORDER WITH MIXED ANXIETY AND DEPRESSED MOOD: Primary | ICD-10-CM

## 2022-05-16 PROCEDURE — 4004F PT TOBACCO SCREEN RCVD TLK: CPT | Performed by: SOCIAL WORKER

## 2022-05-16 PROCEDURE — 90837 PSYTX W PT 60 MINUTES: CPT | Performed by: SOCIAL WORKER

## 2022-05-16 NOTE — PROGRESS NOTES
[address] [place] [address] Consultant      Visit Date: 5/16/2022   Time of appointment:  9am   Time spent with Patient: 57 minutes. This is patient's 11th appointment. Pt and/or guardian was educated on the model of service to include a short-term intervention focused approach and as well as the limits of confidentiality (i.e. abuse reporting, suicide intervention, etc.). Pt and/or guardian indicated understanding. Pt and/or guardian provided informed consent for the behavioral health program.  Visit completed in person. Patient presented alone. Patient Location: [facility], [facility]  Provider Location: [facility] office, [facility]    PCP: Cam Ray DO      Reason for Consult: Follow-up    SUBJECTIVE  Magdalena Luis is a 32 y.o. female who presents for follow up of anxiety and stress. She reports efforts to reduce her own stress by setting different limits with her sister and development of a plan for her to involve her father in the situation and move her own family out of her sister's house. She states she is feeling somewhat better with these efforts and plan. Pt discusses coming to terms with her sister's decisions/behaviors. OBJECTIVE  Affective and emotional state appeared calm. Suicidal thoughts or behaviors not present  Homicidal thoughts or behaviors not present  Hygiene was good   Dress was appropriate  Behavior was Calm and engaged  Attitude was Cooperative. Eye-contact was good . Speech is described as normal rate, normal volume and well articulated  Thought processes were logical without evidence of delusions, hallucinations, obsessions, or myrna  Pt was oriented to person, place, time, and general circumstances with recent memory:  good and remote memory:  good.   Insight and judgment are estimated to be good     PHQ Scores 11/30/2021 10/29/2021 10/27/2021 9/29/2021 3/16/2021 12/20/2019   PHQ2 Score 4 1 4 2 2 4 PHQ9 Score 11 1 10 2 2 11     Interpretation of Total Score Depression Severity: 1-4 = Minimal depression, 5-9 = Mild depression, 10-14 = Moderate depression, 15-19 = Moderately severe depression, 20-27 = Severe depression    ASSESSMENT  Zoie Abraham presented to the appointment today for follow up and treatment of anxiety and stress. She is currently deemed no risk to self or others. The main focus or themes of the visit today included stress management and coping with relationship difficulties. Erik Denver is making progress with current treatment. She would benefit from further behavioral health consultation to address anxiety and stress. Erik Denver was in agreement with recommendations. DIAGNOSIS  Erik Denver was seen today for follow-up. Diagnoses and all orders for this visit:    Adjustment disorder with mixed anxiety and depressed mood        INTERVENTION  Therapeutic strategies included encouragement of expression of emotion, development of self-care mindset and commitment to self-care behaviors, exploring and encouraging use of practices that can support symptom management and personal growth in daily life , therapeutic feedback regarding development and progress, review of work done by patient between visits and exploring and encouraging use of natural social supports. PLAN  Follow up in 3 weeks with [address] [place] [address]  Is interactive complexity present?  No  Reason:  N/A  Additional Supporting Information:  N/A       Electronically signed by Sam Garzon on 5/16/2022 at 1:43 PM

## 2022-06-07 ENCOUNTER — TELEPHONE (OUTPATIENT)
Dept: BEHAVIORAL/MENTAL HEALTH CLINIC | Age: 32
End: 2022-06-07

## 2022-06-21 ENCOUNTER — TELEMEDICINE (OUTPATIENT)
Dept: BEHAVIORAL/MENTAL HEALTH CLINIC | Age: 32
End: 2022-06-21
Payer: MEDICAID

## 2022-06-21 DIAGNOSIS — F43.23 ADJUSTMENT DISORDER WITH MIXED ANXIETY AND DEPRESSED MOOD: Primary | ICD-10-CM

## 2022-06-21 PROCEDURE — 90834 PSYTX W PT 45 MINUTES: CPT | Performed by: SOCIAL WORKER

## 2022-06-21 NOTE — PROGRESS NOTES
BEHAVIORAL HEALTH FOLLOW UP  Steve Carpio, [address] Consultant      Visit Date: 6/21/2022   Time of appointment:  1pm   Time spent with Patient: 44 minutes. This is patient's 12th appointment. Pt and/or guardian was educated on the model of service to include a short-term intervention focused approach and as well as the limits of confidentiality (i.e. abuse reporting, suicide intervention, etc.). Pt and/or guardian indicated understanding. Pt and/or guardian provided informed consent for the behavioral health program.  Visit completed via audio and video and consent for virtual visit was given. .   Patient Location: privately in pt home, [facility]  Provider Location: [facility], [facility]    PCP: Ivet Amezcua DO      Reason for Consult: Follow-up    SUBJECTIVE  Elie is a 32 y.o. female who presents for follow up of anxiety and stress. She reports symptoms improving., She reports situation with sister's behaviors have significantly improved lately due to changes sister has made and pt and sister being able to have some good conversations about the issues. Pt states doing much better overall with this stressor reduced. Pt is working more, boyfriend just started a job he has wanted for a long time. OBJECTIVE  Affective and emotional state appeared generally positive. Suicidal thoughts or behaviors not present  Homicidal thoughts or behaviors not present  Hygiene was good   Dress was appropriate  Behavior was Calm and engaged  Attitude was Cooperative. Eye-contact was good . Speech is described as normal rate, normal volume and well articulated  Thought processes were logical without evidence of delusions, hallucinations, obsessions, or myrna  Pt was oriented to person, place, time, and general circumstances with recent memory:  good and remote memory:  good.   Insight and judgment are estimated to be good     PHQ Scores 5/31/2022 11/30/2021 10/29/2021 10/27/2021

## 2022-07-22 ENCOUNTER — TELEMEDICINE (OUTPATIENT)
Dept: BEHAVIORAL/MENTAL HEALTH CLINIC | Age: 32
End: 2022-07-22
Payer: MEDICAID

## 2022-07-22 DIAGNOSIS — F43.23 ADJUSTMENT DISORDER WITH MIXED ANXIETY AND DEPRESSED MOOD: Primary | ICD-10-CM

## 2022-07-22 PROCEDURE — 90834 PSYTX W PT 45 MINUTES: CPT | Performed by: SOCIAL WORKER

## 2022-07-29 NOTE — PROGRESS NOTES
[address] [place] [address] Consultant      Visit Date: 7/22/2022   Time of appointment:  12pm   Time spent with Patient: 45 minutes. This is patient's 13th appointment. Pt and/or guardian was educated on the model of service to include a short-term intervention focused approach and as well as the limits of confidentiality (i.e. abuse reporting, suicide intervention, etc.). Pt and/or guardian indicated understanding. Pt and/or guardian provided informed consent for the behavioral health program.  Visit completed via audio and video and consent for virtual visit was given. .   Patient Location: privately in pt home, [facility]  Provider Location: [facility], [facility]    PCP: Sunita Underwood DO    Reason for Consult: Follow-up       SUBJECTIVE  Velasquez Leiva is a 32 y.o. female who presents for follow up of anxiety and stress. She reports overall symptoms mostly stable, continuing to be able to engage in personal activities and reduce stressful activities tied to larger family difficulties. States that has some increased sadness and anxiety lately with her brother in [address] [place] [address] 1 year anniversary of passing and the activities around that day. Discusses she plans to try to go to the crash site before the day to help her \"get out\" the emotions before the planned event. OBJECTIVE  Affective and emotional state appeared generally positive. Suicidal thoughts or behaviors not present  Homicidal thoughts or behaviors not present  Hygiene was good   Dress was appropriate  Behavior was Calm and engaged  Attitude was Cooperative. Eye-contact was good . Speech is described as normal rate, normal volume, and well articulated  Thought processes were logical without evidence of delusions, hallucinations, obsessions, or myrna  Pt was oriented to person, place, time, and general circumstances with recent memory:  good and remote memory:  good.   Insight and judgment are estimated to be good     PHQ Scores 5/31/2022 11/30/2021 10/29/2021 10/27/2021 9/29/2021 3/16/2021 12/20/2019   PHQ2 Score 1 4 1 4 2 2 4   PHQ9 Score 1 11 1 10 2 2 11     Interpretation of Total Score Depression Severity: 1-4 = Minimal depression, 5-9 = Mild depression, 10-14 = Moderate depression, 15-19 = Moderately severe depression, 20-27 = Severe depression      ASSESSMENT  Ruben Lewis presented to the appointment today for follow up and treatment of anxiety and stress. She is currently deemed no risk to self or others. The main focus or themes of the visit today included reduction in anxiety, worry and panic and working through grief. Staci Camarena is making progress with current treatment. She would benefit from further behavioral health consultation to address anxiety and stress . Staci Camarena was in agreement with recommendations. DIAGNOSIS  Staci Camarena was seen today for follow-up. Diagnoses and all orders for this visit:    Adjustment disorder with mixed anxiety and depressed mood        INTERVENTION  Therapeutic strategies included development of self-care mindset and commitment to self-care behaviors, practiced/planned exposure practices to reduce unhelpful avoidances, exploring and encouraging use of practices that can support symptom management and personal growth in daily life , and exploring and encouraging use of natural social supports. PLAN  Follow up in 3 weeks with [facility]  Is interactive complexity present?  No  Reason:  N/A  Additional Supporting Information:  N/A       Electronically signed by Nina Simmons on 7/29/2022 at 8:17 AM

## 2022-08-05 ENCOUNTER — TELEMEDICINE (OUTPATIENT)
Dept: BEHAVIORAL/MENTAL HEALTH CLINIC | Age: 32
End: 2022-08-05
Payer: MEDICAID

## 2022-08-05 DIAGNOSIS — F43.23 ADJUSTMENT DISORDER WITH MIXED ANXIETY AND DEPRESSED MOOD: Primary | ICD-10-CM

## 2022-08-05 PROCEDURE — 90834 PSYTX W PT 45 MINUTES: CPT | Performed by: SOCIAL WORKER
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
## 2022-08-15 ENCOUNTER — HOSPITAL ENCOUNTER (OUTPATIENT)
Age: 32
Setting detail: SPECIMEN
Discharge: HOME | End: 2022-08-15

## 2022-08-15 ENCOUNTER — OFFICE VISIT (OUTPATIENT)
Dept: PRIMARY CARE CLINIC | Age: 32
End: 2022-08-15
Payer: MEDICAID

## 2022-08-15 VITALS
SYSTOLIC BLOOD PRESSURE: 118 MMHG | DIASTOLIC BLOOD PRESSURE: 66 MMHG | RESPIRATION RATE: 16 BRPM | HEART RATE: 84 BPM | WEIGHT: 129.8 LBS | BODY MASS INDEX: 22.28 KG/M2 | OXYGEN SATURATION: 98 %

## 2022-08-15 DIAGNOSIS — F33.1 MODERATE EPISODE OF RECURRENT MAJOR DEPRESSIVE DISORDER (HCC): ICD-10-CM

## 2022-08-15 DIAGNOSIS — Z00.00 HEALTHCARE MAINTENANCE: ICD-10-CM

## 2022-08-15 LAB
CHOLESTEROL/HDL RATIO: 3.6
CHOLESTEROL: 142 MG/DL
GLUCOSE FASTING: 78 MG/DL (ref 70–99)
HDLC SERPL-MCNC: 40 MG/DL
LDL CHOLESTEROL: 86 MG/DL (ref 0–130)
TRIGL SERPL-MCNC: 79 MG/DL

## 2022-08-15 PROCEDURE — 99213 OFFICE O/P EST LOW 20 MIN: CPT | Performed by: FAMILY MEDICINE

## 2022-08-15 PROCEDURE — G8427 DOCREV CUR MEDS BY ELIG CLIN: HCPCS | Performed by: FAMILY MEDICINE

## 2022-08-15 PROCEDURE — G8420 CALC BMI NORM PARAMETERS: HCPCS | Performed by: FAMILY MEDICINE

## 2022-08-15 PROCEDURE — 4004F PT TOBACCO SCREEN RCVD TLK: CPT | Performed by: FAMILY MEDICINE

## 2022-08-15 RX ORDER — BUPROPION HYDROCHLORIDE 150 MG/1
150 TABLET ORAL EVERY MORNING
Qty: 90 TABLET | Refills: 3 | Status: SHIPPED | OUTPATIENT
Start: 2022-08-15

## 2022-08-15 RX ORDER — BUSPIRONE HYDROCHLORIDE 10 MG/1
10 TABLET ORAL 3 TIMES DAILY
Qty: 180 TABLET | Refills: 3 | Status: SHIPPED | OUTPATIENT
Start: 2022-08-15

## 2022-08-15 ASSESSMENT — PATIENT HEALTH QUESTIONNAIRE - PHQ9
SUM OF ALL RESPONSES TO PHQ9 QUESTIONS 1 & 2: 0
SUM OF ALL RESPONSES TO PHQ QUESTIONS 1-9: 7
3. TROUBLE FALLING OR STAYING ASLEEP: 2
9. THOUGHTS THAT YOU WOULD BE BETTER OFF DEAD, OR OF HURTING YOURSELF: 0
5. POOR APPETITE OR OVEREATING: 3
SUM OF ALL RESPONSES TO PHQ QUESTIONS 1-9: 7
8. MOVING OR SPEAKING SO SLOWLY THAT OTHER PEOPLE COULD HAVE NOTICED. OR THE OPPOSITE, BEING SO FIGETY OR RESTLESS THAT YOU HAVE BEEN MOVING AROUND A LOT MORE THAN USUAL: 0
4. FEELING TIRED OR HAVING LITTLE ENERGY: 2
SUM OF ALL RESPONSES TO PHQ QUESTIONS 1-9: 7
2. FEELING DOWN, DEPRESSED OR HOPELESS: 0
SUM OF ALL RESPONSES TO PHQ QUESTIONS 1-9: 7
10. IF YOU CHECKED OFF ANY PROBLEMS, HOW DIFFICULT HAVE THESE PROBLEMS MADE IT FOR YOU TO DO YOUR WORK, TAKE CARE OF THINGS AT HOME, OR GET ALONG WITH OTHER PEOPLE: 1
7. TROUBLE CONCENTRATING ON THINGS, SUCH AS READING THE NEWSPAPER OR WATCHING TELEVISION: 0
6. FEELING BAD ABOUT YOURSELF - OR THAT YOU ARE A FAILURE OR HAVE LET YOURSELF OR YOUR FAMILY DOWN: 0
1. LITTLE INTEREST OR PLEASURE IN DOING THINGS: 0

## 2022-08-15 ASSESSMENT — ENCOUNTER SYMPTOMS
VOMITING: 0
SHORTNESS OF BREATH: 0
NAUSEA: 0

## 2022-08-15 NOTE — PROGRESS NOTES
[address] PRIMARY CARE  [address] [facility] [address]  [address] [address]  Dept: [phone]  Dept Fax: [phone]    Shane Ham is a 32 y.o. female who presents today for her medical conditions/complaints as noted below. Shane Ham is c/o of  Chief Complaint   Patient presents with    3 Month Follow-Up         HPI:     HPI    Pt here for follow up for anxiety/depression. She is doing well on wellbutrin and buspar. She is also doing well with therapy as well.        Hemoglobin A1C (%)   Date Value   2020 5.6             ( goal A1C is < 7)   No results found for: LABMICR  LDL Cholesterol (mg/dL)   Date Value   2020 89       (goal LDL is <100)   AST (U/L)   Date Value   2020 16     ALT (U/L)   Date Value   2020 12     BUN (mg/dL)   Date Value   2020 10     BP Readings from Last 3 Encounters:   08/15/22 118/66   22 116/74   21 118/80          (goal 120/80)    Past Medical History:   Diagnosis Date    Rheumatoid arthritis ([facility]     no medications    SVT (supraventricular tachycardia) ([facility] 2014      Past Surgical History:   Procedure Laterality Date    ABLATION OF DYSRHYTHMIC FOCUS      cryoablation svt    COLPOSCOPY  2018    LGSIL    INTRAUTERINE DEVICE INSERTION  2017    Paraguard, later removed    PA COLPOSCOPY,CERVIX W/ADJ VAG,W/LOOP BX N/A 2018    LEEP W/TOP HAT & COLPOSCOPY W/DEPO performed by Citlalli Booth DO at [address]       Family History   Problem Relation Age of Onset    High Blood Pressure Father     COPD Maternal Grandfather     Cancer Paternal Grandmother         bone       Social History     Tobacco Use    Smoking status: Every Day     Packs/day: 0.50     Types: Cigarettes    Smokeless tobacco: Never   Substance Use Topics    Alcohol use: No      Current Outpatient Medications   Medication Sig Dispense Refill    buPROPion (WELLBUTRIN XL) 150 MG extended release tablet Take 1 tablet by mouth every morning 90 tablet 3    busPIRone (BUSPAR) 10 MG tablet Take 1 tablet by mouth in the morning and 1 tablet at noon and 1 tablet before bedtime. 180 tablet 3    hydroxychloroquine (PLAQUENIL) 200 MG tablet Take 200 mg by mouth 2 times daily      sulfaSALAzine (AZULFIDINE) 500 MG tablet Take 500 mg by mouth 4 times daily       No current facility-administered medications for this visit. No Known Allergies    Health Maintenance   Topic Date Due    COVID-19 Vaccine (1) Never done    Varicella vaccine (1 of 2 - 2-dose childhood series) Never done    Pneumococcal 0-64 years Vaccine (1 - PCV) Never done    HIV screen  Never done    Hepatitis C screen  Never done    Flu vaccine (1) 09/01/2022    DTaP/Tdap/Td vaccine (2 - Td or Tdap) 05/13/2023    Depression Monitoring  05/31/2023    Cervical cancer screen  07/29/2026    Hepatitis B vaccine  Completed    Hepatitis A vaccine  Aged Out    Hib vaccine  Aged Out    Meningococcal (ACWY) vaccine  Aged Out       Subjective:      Review of Systems   Constitutional:  Negative for chills and fever. Respiratory:  Negative for shortness of breath. Gastrointestinal:  Negative for nausea and vomiting. Psychiatric/Behavioral:  Negative for dysphoric mood. The patient is not nervous/anxious. Objective:     Physical Exam  Constitutional:       Appearance: She is well-developed. HENT:      Head: Normocephalic and atraumatic. Right Ear: External ear normal.      Left Ear: External ear normal.   Eyes:      Conjunctiva/sclera: Conjunctivae normal.      Pupils: Pupils are equal, round, and reactive to light. Cardiovascular:      Rate and Rhythm: Normal rate and regular rhythm. Heart sounds: Normal heart sounds. Pulmonary:      Effort: Pulmonary effort is normal.      Breath sounds: Normal breath sounds. Musculoskeletal:      Cervical back: Neck supple. Skin:     General: Skin is warm and dry.    Neurological:      Mental Status: She is alert and oriented to person, place, and time. Psychiatric:         Mood and Affect: Mood normal.         Behavior: Behavior normal.         Thought Content: Thought content normal.     /66   Pulse 84   Resp 16   Wt 129 lb 12.8 oz (58.9 kg)   LMP 08/01/2022 (Approximate)   SpO2 98%   BMI 22.28 kg/m²     Assessment:      1. Moderate episode of recurrent major [facility]  - doing well on wellbutrin and buspar for anxiety and depression. Continue therapy as well. - buPROPion (WELLBUTRIN XL) 150 MG extended release tablet; Take 1 tablet by mouth every morning  Dispense: 90 tablet; Refill: 3           Plan:      Return in about 6 months (around 2/15/2023) for follow up for anxiety/depression. No orders of the defined types were placed in this encounter. Orders Placed This Encounter   Medications    buPROPion (WELLBUTRIN XL) 150 MG extended release tablet     Sig: Take 1 tablet by mouth every morning     Dispense:  90 tablet     Refill:  3    busPIRone (BUSPAR) 10 MG tablet     Sig: Take 1 tablet by mouth in the morning and 1 tablet at noon and 1 tablet before bedtime. Dispense:  180 tablet     Refill:  3        Patient given educational materials - see patient instructions. Discussed use, benefit, and side effects of prescribed medications. All patient questions answered. Pt voiced understanding. Reviewed healthmaintenance. Instructed to continue current medications, diet and exercise. Patient agreed with treatment plan. Follow up as directed.      Electronically signed by Ez Pace DO on 8/15/2022 at 9:19 AM

## 2022-08-19 ENCOUNTER — TELEMEDICINE (OUTPATIENT)
Dept: BEHAVIORAL/MENTAL HEALTH CLINIC | Age: 32
End: 2022-08-19
Payer: MEDICAID

## 2022-08-19 DIAGNOSIS — F43.23 ADJUSTMENT DISORDER WITH MIXED ANXIETY AND DEPRESSED MOOD: Primary | ICD-10-CM

## 2022-08-19 PROCEDURE — 90834 PSYTX W PT 45 MINUTES: CPT | Performed by: SOCIAL WORKER

## 2022-08-19 NOTE — PROGRESS NOTES
BEHAVIORAL HEALTH FOLLOW UP  Michelle Murphy Consultant      Visit Date: 8/19/2022   Time of appointment:  9am   Time spent with Patient: 47 minutes. This is patient's 15th appointment. Pt and/or guardian was educated on the model of service to include a short-term intervention focused approach and as well as the limits of confidentiality (i.e. abuse reporting, suicide intervention, etc.). Pt and/or guardian indicated understanding. Pt and/or guardian provided informed consent for the behavioral health program.  Visit completed via audio and video and consent for virtual visit was given. .   Patient Location: privately in pt home, [facility]  Provider Location: [facility], [facility]    PCP: Jaye Bowie DO    Reason for Consult: Follow-up       SUBJECTIVE  Sharon Garcia is a 32 y.o. female who presents for follow up of anxiety and stress. She reports distress over decision on whether to continue with long term relationship or not. Discusses pros and cons, worries about not being able to build trust at this point let go of growing resentment. States that she, son and boyfriend recently moved back in with her parents. OBJECTIVE  Affective and emotional state appeared sad. Suicidal thoughts or behaviors not present  Homicidal thoughts or behaviors not present  Hygiene was good   Dress was appropriate  Behavior was Calm and engaged  Attitude was Cooperative. Eye-contact was good . Speech is described as normal rate, normal volume, and well articulated  Thought processes were logical without evidence of delusions, hallucinations, obsessions, or myrna  Pt was oriented to person, place, time, and general circumstances with recent memory:  good and remote memory:  good.   Insight and judgment are estimated to be good     PHQ Scores 8/15/2022 5/31/2022 11/30/2021 10/29/2021 10/27/2021 9/29/2021 3/16/2021   PHQ2 Score 0 1 4 1 4 2 2   PHQ9 Score 7 1 11 1 10 2 2 Interpretation of Total Score Depression Severity: 1-4 = Minimal depression, 5-9 = Mild depression, 10-14 = Moderate depression, 15-19 = Moderately severe depression, 20-27 = Severe depression      ASSESSMENT  Ishmael Bautista presented to the appointment today for follow up and treatment of anxiety and stress. She is currently deemed no risk to self or others. The main focus or themes of the visit today included coping with relationship difficulties. Jono de la paz is making progress with current treatment and continues to develop capacity for self-care and life management. She would benefit from further behavioral health consultation to address anxiety and stress . Jono de la paz was in agreement with recommendations. DIAGNOSIS  Jono de la paz was seen today for follow-up. Diagnoses and all orders for this visit:    Adjustment disorder with mixed anxiety and depressed mood        INTERVENTION  Therapeutic strategies included encouragement of expression of emotion, guided discovery, exploring and encouraging use of practices that can support symptom management and personal growth in daily life , and review of work done by patient between visits. PLAN  Follow up in 2 weeks with [facility]  Is interactive complexity present?  No  Reason:  N/A  Additional Supporting Information:  N/A       Electronically signed by Virgilio Pickett on 8/19/2022 at 10:19 AM

## 2022-08-24 ENCOUNTER — TELEPHONE (OUTPATIENT)
Dept: PRIMARY CARE CLINIC | Age: 32
End: 2022-08-24

## 2022-08-24 DIAGNOSIS — U07.1 COVID-19: Primary | ICD-10-CM

## 2022-08-24 RX ORDER — ALBUTEROL SULFATE 90 UG/1
2 AEROSOL, METERED RESPIRATORY (INHALATION) 4 TIMES DAILY PRN
Qty: 54 G | Refills: 1 | Status: SHIPPED | OUTPATIENT
Start: 2022-08-24

## 2022-08-24 NOTE — TELEPHONE ENCOUNTER
Staci Migue called and has covid symptoms started yesterday with fever tightness in chest. She stated just general aching and headache. Positive home test today.  She would like medication sent in for her MEDICINE

## 2022-08-24 NOTE — TELEPHONE ENCOUNTER
Please let her know I sent in paxlovid for her (anti-viral for covid) and albuterol. If she develops worsening shortness of breath I recommend ER evaluation.

## 2022-08-31 ENCOUNTER — TELEMEDICINE (OUTPATIENT)
Dept: BEHAVIORAL/MENTAL HEALTH CLINIC | Age: 32
End: 2022-08-31
Payer: MEDICAID

## 2022-08-31 DIAGNOSIS — F43.23 ADJUSTMENT DISORDER WITH MIXED ANXIETY AND DEPRESSED MOOD: Primary | ICD-10-CM

## 2022-08-31 PROCEDURE — 90837 PSYTX W PT 60 MINUTES: CPT | Performed by: SOCIAL WORKER

## 2022-08-31 PROCEDURE — 4004F PT TOBACCO SCREEN RCVD TLK: CPT | Performed by: SOCIAL WORKER

## 2022-09-12 NOTE — PROGRESS NOTES
BEHAVIORAL HEALTH FOLLOW UP  Michelle Silva Consultant      Visit Date: 8/31/2022   Time of appointment:  10am   Time spent with Patient: 56 minutes. This is patient's 16th appointment. Pt and/or guardian was educated on the model of service to include a short-term intervention focused approach and as well as the limits of confidentiality (i.e. abuse reporting, suicide intervention, etc.). Pt and/or guardian indicated understanding. Pt and/or guardian provided informed consent for the behavioral health program.  Visit completed via audio and video and consent for virtual visit was given. .   Patient Location: privately in pt home, [facility]  Provider Location: [facility] office, [facility]    PCP: Uli Petit DO    Reason for Consult: Follow-up       SUBJECTIVE  Rafaela Woodward is a 32 y.o. female who presents for follow up of anxiety and stress. She reports continued stress regarding long term relationship and uncertainty over what she wants to do about the relationship. Pt talks through pros and cons of options and ways she is trying to communicate with partner about how she is feeling. OBJECTIVE  Affective and emotional state appeared sad. Suicidal thoughts or behaviors not present  Homicidal thoughts or behaviors not present  Hygiene was good   Dress was appropriate  Behavior was Calm and engaged  Attitude was Cooperative. Eye-contact was good . Speech is described as normal rate, normal volume, and well articulated  Thought processes were logical without evidence of delusions, hallucinations, obsessions, or myrna  Pt was oriented to person, place, time, and general circumstances with recent memory:  good and remote memory:  good.   Insight and judgment are estimated to be good     PHQ Scores 8/15/2022 5/31/2022 11/30/2021 10/29/2021 10/27/2021 9/29/2021 3/16/2021   PHQ2 Score 0 1 4 1 4 2 2   PHQ9 Score 7 1 11 1 10 2 2     Interpretation of Total Score Depression Severity: 1-4 = Minimal depression, 5-9 = Mild depression, 10-14 = Moderate depression, 15-19 = Moderately severe depression, 20-27 = Severe depression      ASSESSMENT  Orpha Basket presented to the appointment today for follow up and treatment of anxiety and stress. She is currently deemed no risk to self or others. The main focus or themes of the visit today included coping with difficult or intense emotions and coping with relationship difficulties. Tahira Hayes is making progress with current treatment. She would benefit from further behavioral health consultation to address anxiety and stress . Tahira Hayes was in agreement with recommendations. DIAGNOSIS  Tahira Hayes was seen today for follow-up. Diagnoses and all orders for this visit:    Adjustment disorder with mixed anxiety and depressed mood        INTERVENTION  Therapeutic strategies included guided discovery, therapeutic feedback regarding development and progress, and review of work done by patient between visits. PLAN  Follow up in 2 weeks with [address] [place] [address]  Is interactive complexity present?  No  Reason:  N/A  Additional Supporting Information:  N/A       Electronically signed by Carlos A Tejada on 9/12/2022 at 1:47 PM

## 2022-09-14 ENCOUNTER — TELEPHONE (OUTPATIENT)
Dept: BEHAVIORAL/MENTAL HEALTH CLINIC | Age: 32
End: 2022-09-14

## 2022-09-14 NOTE — TELEPHONE ENCOUNTER
.Contacted patient today in regards to missed appointment. Rescheduled. Was call completed? If not, reason: Call was completed    Does patient want to continue services? If no, and the patient is a new patient, please close the referral. Also, if no, please document reason and route message to provider. yes    Anything the provider should be aware of? If yes, please route call.  No    Reason for Missed Appointment: patient forgot about appointment

## 2022-09-29 ENCOUNTER — TELEMEDICINE (OUTPATIENT)
Dept: BEHAVIORAL/MENTAL HEALTH CLINIC | Age: 32
End: 2022-09-29
Payer: MEDICAID

## 2022-09-29 DIAGNOSIS — F43.23 ADJUSTMENT DISORDER WITH MIXED ANXIETY AND DEPRESSED MOOD: Primary | ICD-10-CM

## 2022-09-29 PROCEDURE — 4004F PT TOBACCO SCREEN RCVD TLK: CPT | Performed by: SOCIAL WORKER

## 2022-09-29 PROCEDURE — 90832 PSYTX W PT 30 MINUTES: CPT | Performed by: SOCIAL WORKER

## 2022-09-29 NOTE — PROGRESS NOTES
BEHAVIORAL HEALTH FOLLOW UP  Michelle Estrada Consultant      Visit Date: 9/29/2022   Time of appointment:  11am   Time spent with Patient: 25 minutes. This is patient's 17th appointment. Pt and/or guardian was educated on the model of service to include a short-term intervention focused approach and as well as the limits of confidentiality (i.e. abuse reporting, suicide intervention, etc.). Pt and/or guardian indicated understanding. Pt and/or guardian provided informed consent for the behavioral health program.  Visit completed via audio and video and consent for virtual visit was given. .   Patient Location: privately in pt home, [facility]  Provider Location: [facility] office, [facility]    PCP: Joie Mercado DO    Reason for Consult: Follow-up       MARYAM Pemberton is a 32 y.o. female who presents for follow up of anxiety and stress. She reports stressors improving in her life after better communication and work on relationship with boyfriend. Overall no complaints. She expresses feeling more in control and able to rely on others around her, have breaks and boundaries. Mood is good, no particular anxiety. OBJECTIVE  Affective and emotional state appeared generally positive. Suicidal thoughts or behaviors not present  Homicidal thoughts or behaviors not present  Hygiene was good   Dress was appropriate  Behavior was Calm and engaged  Attitude was Cooperative. Eye-contact was good . Speech is described as normal rate, normal volume, and well articulated  Thought processes were logical without evidence of delusions, hallucinations, obsessions, or myrna  Pt was oriented to person, place, time, and general circumstances with recent memory:  good and remote memory:  good.   Insight and judgment are estimated to be good     PHQ Scores 8/15/2022 5/31/2022 11/30/2021 10/29/2021 10/27/2021 9/29/2021 3/16/2021   PHQ2 Score 0 1 4 1 4 2 2   PHQ9 Score 7 1 11 1 10 2 2 Interpretation of Total Score Depression Severity: 1-4 = Minimal depression, 5-9 = Mild depression, 10-14 = Moderate depression, 15-19 = Moderately severe depression, 20-27 = Severe depression      ASSESSMENT  Shahrzad Aguilar presented to the appointment today for follow up and treatment of anxiety and stress. She is currently deemed no risk to self or others. The main focus or themes of the visit today included review of the therapeutic work to date. Brenton Mcneill shows reduction in symptoms. She is not in need of further behavioral health consultation at this time. Pt can be referred back in the future if needed. Hui Bailey was in agreement with recommendations. DIAGNOSIS  Brenton Mcneill was seen today for follow-up. Diagnoses and all orders for this visit:    Adjustment disorder with mixed anxiety and depressed mood      INTERVENTION  Therapeutic strategies included development of self-care mindset and commitment to self-care behaviors, exploring and encouraging use of practices that can support symptom management and personal growth in daily life , therapeutic feedback regarding development and progress, and review of work done by patient between visits. PLAN  Pt will continue with medications for management of depression and anxiety, and follow up with PCP. She is doing well at this time and is ready to discontinue counseling. Pt understands she can return at any time if she needs additional assistance. INTERACTIVE COMPLEXITY  Is interactive complexity present?  No  Reason:  N/A  Additional Supporting Information:  N/A       Electronically signed by Bishnu Hearn on 9/29/2022 at 1:47 PM

## 2023-01-11 ENCOUNTER — OFFICE VISIT (OUTPATIENT)
Dept: BEHAVIORAL/MENTAL HEALTH CLINIC | Age: 33
End: 2023-01-11
Payer: MEDICAID

## 2023-01-11 DIAGNOSIS — F43.23 ADJUSTMENT DISORDER WITH MIXED ANXIETY AND DEPRESSED MOOD: Primary | ICD-10-CM

## 2023-01-11 PROCEDURE — 4004F PT TOBACCO SCREEN RCVD TLK: CPT | Performed by: SOCIAL WORKER

## 2023-01-11 PROCEDURE — 90837 PSYTX W PT 60 MINUTES: CPT | Performed by: SOCIAL WORKER

## 2023-01-19 ENCOUNTER — OFFICE VISIT (OUTPATIENT)
Dept: BEHAVIORAL/MENTAL HEALTH CLINIC | Age: 33
End: 2023-01-19
Payer: MEDICAID

## 2023-01-19 DIAGNOSIS — F43.23 ADJUSTMENT DISORDER WITH MIXED ANXIETY AND DEPRESSED MOOD: Primary | ICD-10-CM

## 2023-01-19 PROCEDURE — 90834 PSYTX W PT 45 MINUTES: CPT | Performed by: SOCIAL WORKER

## 2023-01-19 PROCEDURE — 4004F PT TOBACCO SCREEN RCVD TLK: CPT | Performed by: SOCIAL WORKER
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
## 2023-01-27 ENCOUNTER — TELEMEDICINE (OUTPATIENT)
Dept: BEHAVIORAL/MENTAL HEALTH CLINIC | Age: 33
End: 2023-01-27
Payer: MEDICAID

## 2023-01-27 DIAGNOSIS — F43.23 ADJUSTMENT DISORDER WITH MIXED ANXIETY AND DEPRESSED MOOD: Primary | ICD-10-CM

## 2023-01-27 PROCEDURE — 4004F PT TOBACCO SCREEN RCVD TLK: CPT | Performed by: SOCIAL WORKER

## 2023-01-27 PROCEDURE — 90832 PSYTX W PT 30 MINUTES: CPT | Performed by: SOCIAL WORKER

## 2023-01-27 NOTE — PROGRESS NOTES
[address] [place] [address] Consultant      Visit Date: 1/27/2023   Time of appointment:  11am   Time spent with Patient: 35 minutes. This is patient's  19  appointment. Pt and/or guardian was educated on the model of service to include a short-term intervention focused approach and as well as the limits of confidentiality (i.e. abuse reporting, suicide intervention, etc.). Pt and/or guardian indicated understanding. Pt and/or guardian provided informed consent for the behavioral health program.  Visit completed via audio and video and consent for virtual visit was given. .   Patient Location:  [facility]  Provider Location: [facility] office, [facility]    PCP: Robinson Gilliland DO    Reason for Consult: Follow-up       SUBJECTIVE  Nikole Flores is a 28 y.o. female who presents for follow up of anxiety and stress. She reports yesterday found out grandmother had a stroke so she and family are with grandmother in [place] while she is in the hospital. Pt is tearful, worried for grandmother's health. Discusses worries and concerns. OBJECTIVE  Affective and emotional state appeared tearful. Suicidal thoughts or behaviors not present  Homicidal thoughts or behaviors not present  Hygiene was good   Dress was appropriate  Behavior was Calm and engaged  Attitude was Cooperative. Eye-contact was good . Speech is described as normal rate, normal volume, and well articulated  Thought processes were logical without evidence of delusions, hallucinations, obsessions, or myrna  Pt was oriented to person, place, time, and general circumstances with recent memory:  good and remote memory:  good.   Insight and judgment are estimated to be good     PHQ Scores 8/15/2022 5/31/2022 11/30/2021 10/29/2021 10/27/2021 9/29/2021 3/16/2021   PHQ2 Score 0 1 4 1 4 2 2   PHQ9 Score 7 1 11 1 10 2 2     Interpretation of Total Score Depression Severity: 1-4 = Minimal depression, 5-9 = Mild depression, 10-14 = Moderate depression, 15-19 = Moderately severe depression, 20-27 = Severe depression      ASSESSMENT  Natalie Quiñonez presented to the appointment today for follow up and treatment of anxiety and stress. She is currently deemed no risk to self or others. The main focus or themes of the visit today included stress management. Velasquez Leiva is making progress with current treatment. She would benefit from further behavioral health consultation to address anxiety and stress . Velasquez Leiva was in agreement with recommendations. DIAGNOSIS  Velasquez Leiva was seen today for follow-up. Diagnoses and all orders for this visit:    Adjustment disorder with mixed anxiety and depressed mood        INTERVENTION  Therapeutic strategies included encouragement of expression of emotion, development of self-care mindset and commitment to self-care behaviors, and exploring and encouraging use of natural social supports. PLAN  Follow up in 2 weeks with [address] [place] [address]  Is interactive complexity present?  No  Reason:  N/A  Additional Supporting Information:  N/A       Electronically signed by Brooks Crook on 1/27/2023 at 1:19 PM

## 2023-02-07 ENCOUNTER — TELEMEDICINE (OUTPATIENT)
Dept: BEHAVIORAL/MENTAL HEALTH CLINIC | Age: 33
End: 2023-02-07
Payer: MEDICAID

## 2023-02-07 DIAGNOSIS — F43.23 ADJUSTMENT DISORDER WITH MIXED ANXIETY AND DEPRESSED MOOD: Primary | ICD-10-CM

## 2023-02-07 PROCEDURE — 4004F PT TOBACCO SCREEN RCVD TLK: CPT | Performed by: SOCIAL WORKER

## 2023-02-07 PROCEDURE — 90832 PSYTX W PT 30 MINUTES: CPT | Performed by: SOCIAL WORKER
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
## 2023-05-17 ENCOUNTER — HOSPITAL ENCOUNTER (OUTPATIENT)
Age: 33
Setting detail: SPECIMEN
Discharge: HOME | End: 2023-05-17

## 2023-05-17 ENCOUNTER — OFFICE VISIT (OUTPATIENT)
Dept: FAMILY MEDICINE CLINIC | Age: 33
End: 2023-05-17
Payer: MEDICAID

## 2023-05-17 VITALS
WEIGHT: 129 LBS | SYSTOLIC BLOOD PRESSURE: 104 MMHG | OXYGEN SATURATION: 98 % | HEIGHT: 64 IN | BODY MASS INDEX: 22.02 KG/M2 | RESPIRATION RATE: 15 BRPM | HEART RATE: 98 BPM | TEMPERATURE: 98.1 F | DIASTOLIC BLOOD PRESSURE: 60 MMHG

## 2023-05-17 DIAGNOSIS — R50.9 FEVER, UNSPECIFIED FEVER CAUSE: ICD-10-CM

## 2023-05-17 DIAGNOSIS — R05.1 ACUTE COUGH: ICD-10-CM

## 2023-05-17 DIAGNOSIS — H66.002 NON-RECURRENT ACUTE SUPPURATIVE OTITIS MEDIA OF LEFT EAR WITHOUT SPONTANEOUS RUPTURE OF TYMPANIC MEMBRANE: Primary | ICD-10-CM

## 2023-05-17 PROCEDURE — 99213 OFFICE O/P EST LOW 20 MIN: CPT

## 2023-05-17 PROCEDURE — 4004F PT TOBACCO SCREEN RCVD TLK: CPT

## 2023-05-17 PROCEDURE — G8427 DOCREV CUR MEDS BY ELIG CLIN: HCPCS

## 2023-05-17 PROCEDURE — G8420 CALC BMI NORM PARAMETERS: HCPCS

## 2023-05-17 RX ORDER — AMOXICILLIN 500 MG/1
500 CAPSULE ORAL 2 TIMES DAILY
Qty: 14 CAPSULE | Refills: 0 | Status: SHIPPED | OUTPATIENT
Start: 2023-05-17 | End: 2023-05-24

## 2023-05-17 SDOH — ECONOMIC STABILITY: FOOD INSECURITY: WITHIN THE PAST 12 MONTHS, YOU WORRIED THAT YOUR FOOD WOULD RUN OUT BEFORE YOU GOT MONEY TO BUY MORE.: NEVER TRUE

## 2023-05-17 SDOH — ECONOMIC STABILITY: FOOD INSECURITY: WITHIN THE PAST 12 MONTHS, THE FOOD YOU BOUGHT JUST DIDN'T LAST AND YOU DIDN'T HAVE MONEY TO GET MORE.: NEVER TRUE

## 2023-05-17 SDOH — ECONOMIC STABILITY: INCOME INSECURITY: HOW HARD IS IT FOR YOU TO PAY FOR THE VERY BASICS LIKE FOOD, HOUSING, MEDICAL CARE, AND HEATING?: NOT HARD AT ALL

## 2023-05-17 SDOH — ECONOMIC STABILITY: HOUSING INSECURITY
IN THE LAST 12 MONTHS, WAS THERE A TIME WHEN YOU DID NOT HAVE A STEADY PLACE TO SLEEP OR SLEPT IN A SHELTER (INCLUDING NOW)?: NO

## 2023-05-17 ASSESSMENT — ENCOUNTER SYMPTOMS
SORE THROAT: 1
SHORTNESS OF BREATH: 0
COUGH: 1
EYE DISCHARGE: 0
WHEEZING: 0

## 2023-05-17 NOTE — PROGRESS NOTES
[facility] WALK-IN  [address] [address] WALK-IN  [address] [place]  [address]  [address] [address]  Dept: [phone]    Janna Cook is a 28 y.o. female Established patient, who presents to the walk-in clinic today with conditions/complaints as noted below:    Chief Complaint   Patient presents with    Pharyngitis     Start: 23  Sx: cough, sore throat when swallowing and cough, left air pain   OTC dayquil and nightquil         HPI:     Patient is a 70-year-old female that presents today for acute illness. Her symptoms started three days ago and include fevers, chills, nasal congestion, sinus drainage, sore throat, and occasional productive cough. She had a 101.4 °F fever last night. States that her left ear has been hurting intermittently, especially when swallowing or coughing. Denies any known sick contacts. She has been taking Tylenol, DayQuil, and NyQuil with mild relief. Denies shortness of breath, wheezing, or chest pain. Past Medical History:   Diagnosis Date    Rheumatoid arthritis ([facility])     no medications    SVT (supraventricular tachycardia) ([facility]) 2014       Current Outpatient Medications   Medication Sig Dispense Refill    amoxicillin (AMOXIL) 500 MG capsule Take 1 capsule by mouth 2 times daily for 7 days 14 capsule 0    albuterol sulfate HFA (VENTOLIN HFA) 108 (90 Base) MCG/ACT inhaler Inhale 2 puffs into the lungs 4 times daily as needed for Wheezing 54 g 1    buPROPion (WELLBUTRIN XL) 150 MG extended release tablet Take 1 tablet by mouth every morning 90 tablet 3    busPIRone (BUSPAR) 10 MG tablet Take 1 tablet by mouth in the morning and 1 tablet at noon and 1 tablet before bedtime.  180 tablet 3    hydroxychloroquine (PLAQUENIL) 200 MG tablet Take 1 tablet by mouth 2 times daily      sulfaSALAzine (AZULFIDINE)

## 2023-05-17 NOTE — PATIENT INSTRUCTIONS
Finish the entire course of antibiotic treatment. Will call with results. Push hydration and rest.  Take Mucinex daily. Use Tylenol or Motrin for fever control or discomfort. Follow-up if worsening or no improvement.

## 2023-05-18 LAB
SARS-COV-2 RNA RESP QL NAA+PROBE: NORMAL
SARS-COV-2 RNA RESP QL NAA+PROBE: NOT DETECTED
SOURCE: NORMAL

## 2023-08-09 ENCOUNTER — E-VISIT (OUTPATIENT)
Dept: PRIMARY CARE CLINIC | Age: 33
End: 2023-08-09
Payer: MEDICAID

## 2023-08-09 ENCOUNTER — TELEPHONE (OUTPATIENT)
Dept: PRIMARY CARE CLINIC | Age: 33
End: 2023-08-09

## 2023-08-09 DIAGNOSIS — U07.1 COVID-19: ICD-10-CM

## 2023-08-09 DIAGNOSIS — R11.0 NAUSEA: Primary | ICD-10-CM

## 2023-08-09 PROCEDURE — 99421 OL DIG E/M SVC 5-10 MIN: CPT | Performed by: FAMILY MEDICINE

## 2023-08-09 RX ORDER — ALBUTEROL SULFATE 90 UG/1
2 AEROSOL, METERED RESPIRATORY (INHALATION) 4 TIMES DAILY PRN
Qty: 54 G | Refills: 1 | Status: SHIPPED | OUTPATIENT
Start: 2023-08-09

## 2023-08-09 RX ORDER — ONDANSETRON 4 MG/1
4 TABLET, ORALLY DISINTEGRATING ORAL 3 TIMES DAILY PRN
Qty: 21 TABLET | Refills: 0 | Status: SHIPPED | OUTPATIENT
Start: 2023-08-09

## 2023-08-09 ASSESSMENT — LIFESTYLE VARIABLES
SMOKING_YEARS: 12
SMOKING_STATUS: YES

## 2023-08-09 NOTE — TELEPHONE ENCOUNTER
The patient called stating that she has had nausea, vomiting, chest tightness, extreme body aches, fever, chills and a slight cough and tested positive today for covid with an at home test.  She states that it feels like a weight is on her chest as well. The patient is asking if her PCP could send in an antiviral for her, as the patient states that her PCP did this in the past for her. Please advise.

## 2023-08-09 NOTE — PROGRESS NOTES
Jorge Jimenez (1990) initiated an asynchronous digital communication through [address] [place] [address]. HPI: per patient questionnaire     Exam: not applicable    Diagnoses and all orders for this visit:  Diagnoses and all orders for this visit:    Nausea  -     ondansetron (ZOFRAN-ODT) 4 MG disintegrating tablet; Take 1 tablet by mouth 3 times daily as needed for Nausea or Vomiting    COVID-19  -     albuterol sulfate HFA (VENTOLIN HFA) 108 (90 Base) MCG/ACT inhaler; Inhale 2 puffs into the lungs 4 times daily as needed for Wheezing  -     nirmatrelvir/ritonavir 300/100 (PAXLOVID) 20 x 150 MG & 10 x 100MG TBPK; Take 3 tablets (two 150 mg nirmatrelvir and one 100 mg ritonavir tablets) by mouth every 12 hours for 5 days. Time: EV1 - 5-10 minutes were spent on the digital evaluation and management of this patient.     [place]-Gann Squibb, DO

## 2023-08-09 NOTE — TELEPHONE ENCOUNTER
Would she be ok with an evisit, I can send it to her and she can fill out . I can send medication once completed if she can do that.

## 2024-12-05 ENCOUNTER — OFFICE VISIT (OUTPATIENT)
Dept: BEHAVIORAL/MENTAL HEALTH CLINIC | Age: 34
End: 2024-12-05
Payer: MEDICAID

## 2024-12-05 DIAGNOSIS — F43.23 ADJUSTMENT DISORDER WITH MIXED ANXIETY AND DEPRESSED MOOD: Primary | ICD-10-CM

## 2024-12-05 PROCEDURE — 90837 PSYTX W PT 60 MINUTES: CPT | Performed by: SOCIAL WORKER

## 2024-12-05 PROCEDURE — 4004F PT TOBACCO SCREEN RCVD TLK: CPT | Performed by: SOCIAL WORKER

## 2024-12-05 ASSESSMENT — PATIENT HEALTH QUESTIONNAIRE - PHQ9
7. TROUBLE CONCENTRATING ON THINGS, SUCH AS READING THE NEWSPAPER OR WATCHING TELEVISION: MORE THAN HALF THE DAYS
8. MOVING OR SPEAKING SO SLOWLY THAT OTHER PEOPLE COULD HAVE NOTICED. OR THE OPPOSITE, BEING SO FIGETY OR RESTLESS THAT YOU HAVE BEEN MOVING AROUND A LOT MORE THAN USUAL: SEVERAL DAYS
SUM OF ALL RESPONSES TO PHQ9 QUESTIONS 1 & 2: 4
SUM OF ALL RESPONSES TO PHQ QUESTIONS 1-9: 18
1. LITTLE INTEREST OR PLEASURE IN DOING THINGS: MORE THAN HALF THE DAYS
10. IF YOU CHECKED OFF ANY PROBLEMS, HOW DIFFICULT HAVE THESE PROBLEMS MADE IT FOR YOU TO DO YOUR WORK, TAKE CARE OF THINGS AT HOME, OR GET ALONG WITH OTHER PEOPLE: SOMEWHAT DIFFICULT
3. TROUBLE FALLING OR STAYING ASLEEP: NEARLY EVERY DAY
1. LITTLE INTEREST OR PLEASURE IN DOING THINGS: MORE THAN HALF THE DAYS
SUM OF ALL RESPONSES TO PHQ QUESTIONS 1-9: 18
SUM OF ALL RESPONSES TO PHQ QUESTIONS 1-9: 18
10. IF YOU CHECKED OFF ANY PROBLEMS, HOW DIFFICULT HAVE THESE PROBLEMS MADE IT FOR YOU TO DO YOUR WORK, TAKE CARE OF THINGS AT HOME, OR GET ALONG WITH OTHER PEOPLE: SOMEWHAT DIFFICULT
2. FEELING DOWN, DEPRESSED OR HOPELESS: MORE THAN HALF THE DAYS
6. FEELING BAD ABOUT YOURSELF - OR THAT YOU ARE A FAILURE OR HAVE LET YOURSELF OR YOUR FAMILY DOWN: MORE THAN HALF THE DAYS
4. FEELING TIRED OR HAVING LITTLE ENERGY: NEARLY EVERY DAY
5. POOR APPETITE OR OVEREATING: NEARLY EVERY DAY
8. MOVING OR SPEAKING SO SLOWLY THAT OTHER PEOPLE COULD HAVE NOTICED. OR THE OPPOSITE - BEING SO FIDGETY OR RESTLESS THAT YOU HAVE BEEN MOVING AROUND A LOT MORE THAN USUAL: SEVERAL DAYS
7. TROUBLE CONCENTRATING ON THINGS, SUCH AS READING THE NEWSPAPER OR WATCHING TELEVISION: MORE THAN HALF THE DAYS
2. FEELING DOWN, DEPRESSED OR HOPELESS: MORE THAN HALF THE DAYS
SUM OF ALL RESPONSES TO PHQ QUESTIONS 1-9: 18
5. POOR APPETITE OR OVEREATING: NEARLY EVERY DAY
4. FEELING TIRED OR HAVING LITTLE ENERGY: NEARLY EVERY DAY
9. THOUGHTS THAT YOU WOULD BE BETTER OFF DEAD, OR OF HURTING YOURSELF: NOT AT ALL
9. THOUGHTS THAT YOU WOULD BE BETTER OFF DEAD, OR OF HURTING YOURSELF: NOT AT ALL
6. FEELING BAD ABOUT YOURSELF - OR THAT YOU ARE A FAILURE OR HAVE LET YOURSELF OR YOUR FAMILY DOWN: MORE THAN HALF THE DAYS
3. TROUBLE FALLING OR STAYING ASLEEP: NEARLY EVERY DAY
SUM OF ALL RESPONSES TO PHQ QUESTIONS 1-9: 18

## 2024-12-05 ASSESSMENT — ANXIETY QUESTIONNAIRES
5. BEING SO RESTLESS THAT IT IS HARD TO SIT STILL: MORE THAN HALF THE DAYS
7. FEELING AFRAID AS IF SOMETHING AWFUL MIGHT HAPPEN: SEVERAL DAYS
4. TROUBLE RELAXING: SEVERAL DAYS
3. WORRYING TOO MUCH ABOUT DIFFERENT THINGS: MORE THAN HALF THE DAYS
2. NOT BEING ABLE TO STOP OR CONTROL WORRYING: MORE THAN HALF THE DAYS
5. BEING SO RESTLESS THAT IT IS HARD TO SIT STILL: MORE THAN HALF THE DAYS
1. FEELING NERVOUS, ANXIOUS, OR ON EDGE: SEVERAL DAYS
7. FEELING AFRAID AS IF SOMETHING AWFUL MIGHT HAPPEN: SEVERAL DAYS
6. BECOMING EASILY ANNOYED OR IRRITABLE: MORE THAN HALF THE DAYS
IF YOU CHECKED OFF ANY PROBLEMS ON THIS QUESTIONNAIRE, HOW DIFFICULT HAVE THESE PROBLEMS MADE IT FOR YOU TO DO YOUR WORK, TAKE CARE OF THINGS AT HOME, OR GET ALONG WITH OTHER PEOPLE: SOMEWHAT DIFFICULT
GAD7 TOTAL SCORE: 11
6. BECOMING EASILY ANNOYED OR IRRITABLE: MORE THAN HALF THE DAYS
2. NOT BEING ABLE TO STOP OR CONTROL WORRYING: MORE THAN HALF THE DAYS
IF YOU CHECKED OFF ANY PROBLEMS ON THIS QUESTIONNAIRE, HOW DIFFICULT HAVE THESE PROBLEMS MADE IT FOR YOU TO DO YOUR WORK, TAKE CARE OF THINGS AT HOME, OR GET ALONG WITH OTHER PEOPLE: SOMEWHAT DIFFICULT
4. TROUBLE RELAXING: SEVERAL DAYS
1. FEELING NERVOUS, ANXIOUS, OR ON EDGE: SEVERAL DAYS
3. WORRYING TOO MUCH ABOUT DIFFERENT THINGS: MORE THAN HALF THE DAYS

## 2024-12-12 NOTE — PROGRESS NOTES
BEHAVIORAL HEALTH FOLLOW UP  AKILAH Mena  Behavioral Health Consultant      Visit Date: 12/5/2024   Time of appointment:  2pm   Time spent with Patient: 60 minutes.   This is patient's  21  appointment.    Pt and/or guardian was educated on the model of service to include a short-term intervention focused approach and as well as the limits of confidentiality (i.e. abuse reporting, suicide intervention, etc.).  Pt and/or guardian indicated understanding. Pt and/or guardian provided informed consent for the behavioral health program.  Visit completed in person. Patient presented alone.   Patient Location: [facility] [place], [place]  Provider Location: [facility] [place], [place]    PCP: Malorie Rasmussen DO    Reason for Consult:  Follow-up       MARYAM Dwyer is a 34 y.o. female who presents for follow up of depression, stress, and relationship problems. She reports returning for specific advise about current situation in her family life. States that overall she is actually doing well, better than last visit in  2023. Updates that since that time she has ended the relationship with long term boyfriend, and he has now moved back with his family in another state. She has started a new relationship and feels very supported and cared about. States she notices confusion at times when he is kind \"because I'm not used to it\". States that her concern is with how to support her son when he verbalizes boundaries in how much he wants to talk with his father out of concerns that the father will be upset. States father has no legal rights in place. States that her inclination is to support her son's boundaries, and help facilitate healthy communication between them when son has willingness to talk to him (through phone/video call). Brings up worries if she focuses son to talk to him more he will shut down and not talk with her as honestly anymore, but feels pressure that she is supposed to

## 2025-01-30 ENCOUNTER — OFFICE VISIT (OUTPATIENT)
Dept: BEHAVIORAL/MENTAL HEALTH CLINIC | Age: 35
End: 2025-01-30
Payer: MEDICAID

## 2025-01-30 DIAGNOSIS — F43.23 ADJUSTMENT DISORDER WITH MIXED ANXIETY AND DEPRESSED MOOD: Primary | ICD-10-CM

## 2025-01-30 PROCEDURE — 4004F PT TOBACCO SCREEN RCVD TLK: CPT | Performed by: SOCIAL WORKER

## 2025-01-30 PROCEDURE — 90837 PSYTX W PT 60 MINUTES: CPT | Performed by: SOCIAL WORKER

## 2025-01-30 ASSESSMENT — ANXIETY QUESTIONNAIRES
6. BECOMING EASILY ANNOYED OR IRRITABLE: 2-OVER HALF THE DAYS
7. FEELING AFRAID AS IF SOMETHING AWFUL MIGHT HAPPEN: 2-OVER HALF THE DAYS
4. TROUBLE RELAXING: 2-OVER HALF THE DAYS
GAD7 TOTAL SCORE: 15
2. NOT BEING ABLE TO STOP OR CONTROL WORRYING: 2-OVER HALF THE DAYS
5. BEING SO RESTLESS THAT IT IS HARD TO SIT STILL: 2-OVER HALF THE DAYS
3. WORRYING TOO MUCH ABOUT DIFFERENT THINGS: 2-OVER HALF THE DAYS
1. FEELING NERVOUS, ANXIOUS, OR ON EDGE: NEARLY EVERY DAY

## 2025-01-30 ASSESSMENT — PATIENT HEALTH QUESTIONNAIRE - PHQ9
SUM OF ALL RESPONSES TO PHQ QUESTIONS 1-9: 15
5. POOR APPETITE OR OVEREATING: MORE THAN HALF THE DAYS
SUM OF ALL RESPONSES TO PHQ9 QUESTIONS 1 & 2: 5
SUM OF ALL RESPONSES TO PHQ QUESTIONS 1-9: 15
2. FEELING DOWN, DEPRESSED OR HOPELESS: NEARLY EVERY DAY
9. THOUGHTS THAT YOU WOULD BE BETTER OFF DEAD, OR OF HURTING YOURSELF: NOT AT ALL
7. TROUBLE CONCENTRATING ON THINGS, SUCH AS READING THE NEWSPAPER OR WATCHING TELEVISION: SEVERAL DAYS
3. TROUBLE FALLING OR STAYING ASLEEP: NEARLY EVERY DAY
1. LITTLE INTEREST OR PLEASURE IN DOING THINGS: MORE THAN HALF THE DAYS
SUM OF ALL RESPONSES TO PHQ QUESTIONS 1-9: 15
SUM OF ALL RESPONSES TO PHQ QUESTIONS 1-9: 15
6. FEELING BAD ABOUT YOURSELF - OR THAT YOU ARE A FAILURE OR HAVE LET YOURSELF OR YOUR FAMILY DOWN: SEVERAL DAYS
8. MOVING OR SPEAKING SO SLOWLY THAT OTHER PEOPLE COULD HAVE NOTICED. OR THE OPPOSITE, BEING SO FIGETY OR RESTLESS THAT YOU HAVE BEEN MOVING AROUND A LOT MORE THAN USUAL: NOT AT ALL
4. FEELING TIRED OR HAVING LITTLE ENERGY: NEARLY EVERY DAY

## 2025-02-04 NOTE — PROGRESS NOTES
BEHAVIORAL HEALTH FOLLOW UP  AKILAH Mena  Behavioral Health Consultant      Visit Date: 1/30/2025   Time of appointment:  3pm   Time spent with Patient: 60 minutes.   This is patient's  22  appointment.    Pt and/or guardian was educated on the model of service to include a short-term intervention focused approach and as well as the limits of confidentiality (i.e. abuse reporting, suicide intervention, etc.).  Pt and/or guardian indicated understanding. Pt and/or guardian provided informed consent for the behavioral health program.  Visit completed in person. Patient presented alone.   Patient Location: [facility], [place]  Provider Location: [facility] [place], [place]    PCP: Malorie Rasmussen DO    Reason for Consult:  Follow-up       MARYAM Dwyer is a 34 y.o. female who presents for follow up of depression, stress, and relationship problems. She reports scheduling this appointment after her grandmother had passed right before Jono and pt was grieving, but since scheduling has now also experienced her best friend's mother going into hospice and pt and friend doing most of the around the clock care for her over this past month. She is dealing with exhaustion and grief.      OBJECTIVE  Affective and emotional state appeared sad.   Suicidal thoughts or behaviors not present  Homicidal thoughts or behaviors not present  Hygiene was good   Dress was appropriate  Behavior was Calm and engaged  Attitude was Cooperative.  Eye-contact was good .  Speech is described as normal rate, normal volume, and well articulated  Thought processes were logical without evidence of delusions, hallucinations, obsessions, or myrna  Pt was oriented to person, place, time, and general circumstances with recent memory:  good and remote memory:  good.  Insight and judgment are estimated to be good         1/30/2025     3:03 PM 12/5/2024     8:18 AM 8/15/2022     9:05 AM 5/31/2022     9:34

## 2025-02-21 ENCOUNTER — OFFICE VISIT (OUTPATIENT)
Dept: OBGYN CLINIC | Age: 35
End: 2025-02-21

## 2025-02-21 VITALS
WEIGHT: 126 LBS | HEIGHT: 64 IN | DIASTOLIC BLOOD PRESSURE: 60 MMHG | BODY MASS INDEX: 21.51 KG/M2 | SYSTOLIC BLOOD PRESSURE: 110 MMHG

## 2025-02-21 DIAGNOSIS — Z98.890 STATUS POST CRYOABLATION: ICD-10-CM

## 2025-02-21 DIAGNOSIS — Z31.69 ENCOUNTER FOR PRECONCEPTION CONSULTATION: ICD-10-CM

## 2025-02-21 DIAGNOSIS — Z98.890 S/P LEEP (LOOP ELECTROSURGICAL EXCISION PROCEDURE): Primary | ICD-10-CM

## 2025-02-21 DIAGNOSIS — N97.0 OLIGO-OVULATION: ICD-10-CM

## 2025-02-21 DIAGNOSIS — I47.10 SVT (SUPRAVENTRICULAR TACHYCARDIA): ICD-10-CM

## 2025-02-21 DIAGNOSIS — I95.1 DYSAUTONOMIA ORTHOSTATIC HYPOTENSION SYNDROME: ICD-10-CM

## 2025-02-21 RX ORDER — FOLIC ACID 1 MG/1
1 TABLET ORAL DAILY
Qty: 30 TABLET | Refills: 5 | Status: SHIPPED | OUTPATIENT
Start: 2025-02-21

## 2025-02-21 RX ORDER — FOLIC ACID 1 MG/1
1 TABLET ORAL DAILY
COMMUNITY
End: 2025-02-21 | Stop reason: SDUPTHER

## 2025-02-21 SDOH — ECONOMIC STABILITY: FOOD INSECURITY: WITHIN THE PAST 12 MONTHS, YOU WORRIED THAT YOUR FOOD WOULD RUN OUT BEFORE YOU GOT MONEY TO BUY MORE.: NEVER TRUE

## 2025-02-21 SDOH — ECONOMIC STABILITY: FOOD INSECURITY: WITHIN THE PAST 12 MONTHS, THE FOOD YOU BOUGHT JUST DIDN'T LAST AND YOU DIDN'T HAVE MONEY TO GET MORE.: NEVER TRUE

## 2025-02-21 ASSESSMENT — PATIENT HEALTH QUESTIONNAIRE - PHQ9
9. THOUGHTS THAT YOU WOULD BE BETTER OFF DEAD, OR OF HURTING YOURSELF: NOT AT ALL
SUM OF ALL RESPONSES TO PHQ QUESTIONS 1-9: 0
SUM OF ALL RESPONSES TO PHQ QUESTIONS 1-9: 0
6. FEELING BAD ABOUT YOURSELF - OR THAT YOU ARE A FAILURE OR HAVE LET YOURSELF OR YOUR FAMILY DOWN: NOT AT ALL
10. IF YOU CHECKED OFF ANY PROBLEMS, HOW DIFFICULT HAVE THESE PROBLEMS MADE IT FOR YOU TO DO YOUR WORK, TAKE CARE OF THINGS AT HOME, OR GET ALONG WITH OTHER PEOPLE: NOT DIFFICULT AT ALL
SUM OF ALL RESPONSES TO PHQ9 QUESTIONS 1 & 2: 0
7. TROUBLE CONCENTRATING ON THINGS, SUCH AS READING THE NEWSPAPER OR WATCHING TELEVISION: NOT AT ALL
1. LITTLE INTEREST OR PLEASURE IN DOING THINGS: NOT AT ALL
2. FEELING DOWN, DEPRESSED OR HOPELESS: NOT AT ALL
SUM OF ALL RESPONSES TO PHQ QUESTIONS 1-9: 0
5. POOR APPETITE OR OVEREATING: NOT AT ALL
8. MOVING OR SPEAKING SO SLOWLY THAT OTHER PEOPLE COULD HAVE NOTICED. OR THE OPPOSITE, BEING SO FIGETY OR RESTLESS THAT YOU HAVE BEEN MOVING AROUND A LOT MORE THAN USUAL: NOT AT ALL
3. TROUBLE FALLING OR STAYING ASLEEP: NOT AT ALL
SUM OF ALL RESPONSES TO PHQ QUESTIONS 1-9: 0
4. FEELING TIRED OR HAVING LITTLE ENERGY: NOT AT ALL

## 2025-02-21 NOTE — PROGRESS NOTES
Yuliya Merino  2025    YOB: 1990    The patient was seen today. She is here regarding re-establish care and family planning. Her bowels are regular and she is voiding without difficulty.     HPI:  Yuliya Merino is a 34 y.o. female      Pt here to re-establish care.  She is in a new relationship and she is considering pregnancy.    Cycle 25-26d  Flow 3-4d  Regular periods  Denies vaginal discharge or irritation  Last pap smear 2021 - normal    She has history of LEEP 2018 - discussed increased surveillance and cervical length screening  She has a history of cardiac issues with SVT and cardiac ablation 2018    She is with new partner and feels safe and supported (Mitesh).    She is taking PNV and denies any changes in her health.    Counseled on risks of pregnancy at age 35 or greater including increase risk of chromosomal abnormalities, increase risk of miscarriage.          OB History    Para Term  AB Living   2 1 1 0 1 1   SAB IAB Ectopic Molar Multiple Live Births   1 0 0 0 0 1      # Outcome Date GA Lbr Ayaan/2nd Weight Sex Type Anes PTL Lv   2 Term 2017 39w0d  2.75 kg (6 lb 1 oz) M Vag-Spont  N CESAR   1 SAB                Past Medical History:   Diagnosis Date    Abnormal Pap smear of cervix     Depression     Postpartum depression     Rheumatoid arthritis ([facility])     no medications    SVT (supraventricular tachycardia) 2014       Past Surgical History:   Procedure Laterality Date    ABLATION OF DYSRHYTHMIC FOCUS      cryoablation svt    COLPOSCOPY  2018    LGSIL    INTRAUTERINE DEVICE INSERTION  2017    Paraguard, later removed    OR COLPOSCOPY CERVIX VAG LOOP ELTRD BX CERVIX N/A 2018    LEEP W/TOP HAT & COLPOSCOPY W/DEPO performed by Arnol Ross DO at [facility] OR       Family History   Problem Relation Age of Onset    High Blood Pressure Father     COPD Maternal Grandfather     Cancer Paternal Grandmother         bone    Breast Cancer

## 2025-03-14 ENCOUNTER — HOSPITAL ENCOUNTER (OUTPATIENT)
Age: 35
Setting detail: SPECIMEN
Discharge: HOME | End: 2025-03-14

## 2025-03-14 DIAGNOSIS — N97.0 OLIGO-OVULATION: ICD-10-CM

## 2025-03-14 LAB
ALBUMIN SERPL-MCNC: 4.4 G/DL (ref 3.5–5.2)
ALBUMIN/GLOB SERPL: 2 {RATIO} (ref 1–2.5)
ALP SERPL-CCNC: 53 U/L (ref 35–104)
ALT SERPL-CCNC: 11 U/L (ref 10–35)
ANION GAP SERPL CALCULATED.3IONS-SCNC: 13 MMOL/L (ref 9–16)
AST SERPL-CCNC: 15 U/L (ref 10–35)
BASOPHILS # BLD: 0 K/UL (ref 0–0.2)
BASOPHILS NFR BLD: 0 % (ref 0–2)
BILIRUB SERPL-MCNC: 0.3 MG/DL (ref 0–1.2)
BUN SERPL-MCNC: 9 MG/DL (ref 6–20)
CALCIUM SERPL-MCNC: 9.4 MG/DL (ref 8.6–10.4)
CHLORIDE SERPL-SCNC: 104 MMOL/L (ref 98–107)
CO2 SERPL-SCNC: 22 MMOL/L (ref 20–31)
CREAT SERPL-MCNC: 0.7 MG/DL (ref 0.6–0.9)
EOSINOPHIL # BLD: 0.43 K/UL (ref 0–0.4)
EOSINOPHILS RELATIVE PERCENT: 5 % (ref 1–4)
ERYTHROCYTE [DISTWIDTH] IN BLOOD BY AUTOMATED COUNT: 13.1 % (ref 11.8–14.4)
GFR, ESTIMATED: >90 ML/MIN/1.73M2
GLUCOSE SERPL-MCNC: 117 MG/DL (ref 74–99)
HCT VFR BLD AUTO: 41.3 % (ref 36.3–47.1)
HGB BLD-MCNC: 13.2 G/DL (ref 11.9–15.1)
IMM GRANULOCYTES # BLD AUTO: 0 K/UL (ref 0–0.3)
IMM GRANULOCYTES NFR BLD: 0 %
LYMPHOCYTES NFR BLD: 4.41 K/UL (ref 1–4.8)
LYMPHOCYTES RELATIVE PERCENT: 52 % (ref 24–44)
MCH RBC QN AUTO: 30.3 PG (ref 25.2–33.5)
MCHC RBC AUTO-ENTMCNC: 32 G/DL (ref 28.4–34.8)
MCV RBC AUTO: 94.9 FL (ref 82.6–102.9)
MONOCYTES NFR BLD: 0.43 K/UL (ref 0.1–0.8)
MONOCYTES NFR BLD: 5 % (ref 1–7)
MORPHOLOGY: NORMAL
NEUTROPHILS NFR BLD: 38 % (ref 36–66)
NEUTS SEG NFR BLD: 3.23 K/UL (ref 1.8–7.7)
NRBC BLD-RTO: 0 PER 100 WBC
PLATELET # BLD AUTO: 291 K/UL (ref 138–453)
PMV BLD AUTO: 10.6 FL (ref 8.1–13.5)
POTASSIUM SERPL-SCNC: 3.9 MMOL/L (ref 3.7–5.3)
PROGEST SERPL-MCNC: 0.62 NG/ML
PROT SERPL-MCNC: 6.6 G/DL (ref 6.6–8.7)
RBC # BLD AUTO: 4.35 M/UL (ref 3.95–5.11)
SODIUM SERPL-SCNC: 139 MMOL/L (ref 136–145)
TSH SERPL DL<=0.05 MIU/L-ACNC: 1.05 UIU/ML (ref 0.27–4.2)
WBC OTHER # BLD: 8.5 K/UL (ref 3.5–11.3)

## 2025-03-16 ENCOUNTER — RESULTS FOLLOW-UP (OUTPATIENT)
Dept: OBGYN CLINIC | Age: 35
End: 2025-03-16

## 2025-03-18 LAB — ANTI-MULLERIAN HORMONE: 1.95 NG/ML (ref 0.18–11.71)

## 2025-04-21 ENCOUNTER — OFFICE VISIT (OUTPATIENT)
Dept: OBGYN CLINIC | Age: 35
End: 2025-04-21
Payer: MEDICAID

## 2025-04-21 ENCOUNTER — HOSPITAL ENCOUNTER (OUTPATIENT)
Age: 35
Setting detail: SPECIMEN
Discharge: HOME | End: 2025-04-21

## 2025-04-21 VITALS
HEIGHT: 64 IN | BODY MASS INDEX: 21.17 KG/M2 | WEIGHT: 124 LBS | SYSTOLIC BLOOD PRESSURE: 112 MMHG | DIASTOLIC BLOOD PRESSURE: 60 MMHG

## 2025-04-21 DIAGNOSIS — Z01.419 PAP SMEAR, AS PART OF ROUTINE GYNECOLOGICAL EXAMINATION: Primary | ICD-10-CM

## 2025-04-21 DIAGNOSIS — N97.0 OLIGO-OVULATION: ICD-10-CM

## 2025-04-21 PROCEDURE — 99395 PREV VISIT EST AGE 18-39: CPT | Performed by: OBSTETRICS & GYNECOLOGY

## 2025-04-21 SDOH — ECONOMIC STABILITY: FOOD INSECURITY: WITHIN THE PAST 12 MONTHS, THE FOOD YOU BOUGHT JUST DIDN'T LAST AND YOU DIDN'T HAVE MONEY TO GET MORE.: NEVER TRUE

## 2025-04-21 SDOH — ECONOMIC STABILITY: FOOD INSECURITY: WITHIN THE PAST 12 MONTHS, YOU WORRIED THAT YOUR FOOD WOULD RUN OUT BEFORE YOU GOT MONEY TO BUY MORE.: NEVER TRUE

## 2025-04-21 ASSESSMENT — PATIENT HEALTH QUESTIONNAIRE - PHQ9
1. LITTLE INTEREST OR PLEASURE IN DOING THINGS: NOT AT ALL
2. FEELING DOWN, DEPRESSED OR HOPELESS: NOT AT ALL
SUM OF ALL RESPONSES TO PHQ QUESTIONS 1-9: 0

## 2025-04-21 NOTE — PROGRESS NOTES
History and Physical    Yuliya Merino  2025              34 y.o.  Chief Complaint   Patient presents with    Annual Exam       Patient's last menstrual period was 2025.             Primary Care Physician: Malorie Rasmussen DO    The patient was seen and examined. She has no chief complaint today and is here for her annual exam.  Her bowels are regular. There are no voiding complaints. She denies any bloating.  She denies vaginal discharge and was counseled on STD's and the need for barrier contraception.     HPI : Yuliya Merino is a 34 y.o. female     She is feeling well.  She is tracking her cycles as her and her partner are trying for pregnancy.  No complaints today, declining cultures    ________________________________________________________________________  OB History    Para Term  AB Living   2 1 1 0 1 1   SAB IAB Ectopic Molar Multiple Live Births   1 0 0 0 0 1      # Outcome Date GA Lbr Ayaan/2nd Weight Sex Type Anes PTL Lv   2 Term 2017 39w0d  2.75 kg (6 lb 1 oz) M Vag-Spont  N CESAR   1 SAB              Past Medical History:   Diagnosis Date    Abnormal Pap smear of cervix     Depression     Postpartum depression     Rheumatoid arthritis (HCC)     no medications    SVT (supraventricular tachycardia) 2014                                                                   Past Surgical History:   Procedure Laterality Date    ABLATION OF DYSRHYTHMIC FOCUS      cryoablation svt    COLPOSCOPY  2018    LGSIL    INTRAUTERINE DEVICE INSERTION  2017    Paraguard, later removed    MD COLPOSCOPY CERVIX VAG LOOP ELTRD BX CERVIX N/A 2018    LEEP W/TOP HAT & COLPOSCOPY W/DEPO performed by Arnol Ross DO at [facility] OR     Family History   Problem Relation Age of Onset    High Blood Pressure Father     COPD Maternal Grandfather     Cancer Paternal Grandmother         bone    Breast Cancer Paternal Grandmother      Social History     Socioeconomic History

## 2025-04-23 LAB
HPV I/H RISK 4 DNA CVX QL NAA+PROBE: DETECTED
HPV SAMPLE: ABNORMAL
HPV, INTERPRETATION: ABNORMAL
HPV16 DNA CVX QL NAA+PROBE: NOT DETECTED
HPV18 DNA CVX QL NAA+PROBE: NOT DETECTED
SPECIMEN DESCRIPTION: ABNORMAL

## 2025-04-25 ENCOUNTER — E-VISIT (OUTPATIENT)
Dept: PRIMARY CARE CLINIC | Age: 35
End: 2025-04-25
Payer: MEDICAID

## 2025-04-25 DIAGNOSIS — U07.1 COVID-19: Primary | ICD-10-CM

## 2025-04-25 PROCEDURE — 99421 OL DIG E/M SVC 5-10 MIN: CPT | Performed by: FAMILY MEDICINE

## 2025-04-25 NOTE — PROGRESS NOTES
Yuliya Merino (1990) initiated an asynchronous digital communication through [employer].    HPI: per patient questionnaire     Exam: not applicable    Diagnoses and all orders for this visit:  Diagnoses and all orders for this visit:    COVID-19    - continue supportive treatment, can return to work Monday.       5-10 minutes were spent on the digital evaluation and management of this patient.    Malorie Rasmussen,

## 2025-04-29 ENCOUNTER — HOSPITAL ENCOUNTER (OUTPATIENT)
Age: 35
Setting detail: SPECIMEN
Discharge: HOME | End: 2025-04-29

## 2025-04-29 ENCOUNTER — RESULTS FOLLOW-UP (OUTPATIENT)
Dept: OBGYN CLINIC | Age: 35
End: 2025-04-29

## 2025-04-29 DIAGNOSIS — N97.0 OLIGO-OVULATION: ICD-10-CM

## 2025-04-29 LAB
CYTOLOGY REPORT: NORMAL
PROGEST SERPL-MCNC: 5.46 NG/ML

## 2025-05-04 ENCOUNTER — RESULTS FOLLOW-UP (OUTPATIENT)
Dept: OBGYN CLINIC | Age: 35
End: 2025-05-04

## 2025-07-07 ENCOUNTER — TELEPHONE (OUTPATIENT)
Dept: PRIMARY CARE CLINIC | Age: 35
End: 2025-07-07

## 2025-07-07 NOTE — TELEPHONE ENCOUNTER
Patient called in today wondering if there is any way she can be squeezed in or seen sooner. Patient states even if it is a virtual. She is scheduled for the 21st.

## 2025-07-16 ENCOUNTER — TELEMEDICINE (OUTPATIENT)
Dept: BEHAVIORAL/MENTAL HEALTH CLINIC | Age: 35
End: 2025-07-16
Payer: MEDICAID

## 2025-07-16 DIAGNOSIS — F43.23 ADJUSTMENT DISORDER WITH MIXED ANXIETY AND DEPRESSED MOOD: Primary | ICD-10-CM

## 2025-07-16 PROCEDURE — 90837 PSYTX W PT 60 MINUTES: CPT | Performed by: SOCIAL WORKER

## 2025-07-16 PROCEDURE — 4004F PT TOBACCO SCREEN RCVD TLK: CPT | Performed by: SOCIAL WORKER

## 2025-07-16 ASSESSMENT — PATIENT HEALTH QUESTIONNAIRE - PHQ9
2. FEELING DOWN, DEPRESSED OR HOPELESS: SEVERAL DAYS
SUM OF ALL RESPONSES TO PHQ QUESTIONS 1-9: 11
9. THOUGHTS THAT YOU WOULD BE BETTER OFF DEAD, OR OF HURTING YOURSELF: NOT AT ALL
3. TROUBLE FALLING OR STAYING ASLEEP: MORE THAN HALF THE DAYS
10. IF YOU CHECKED OFF ANY PROBLEMS, HOW DIFFICULT HAVE THESE PROBLEMS MADE IT FOR YOU TO DO YOUR WORK, TAKE CARE OF THINGS AT HOME, OR GET ALONG WITH OTHER PEOPLE: SOMEWHAT DIFFICULT
1. LITTLE INTEREST OR PLEASURE IN DOING THINGS: SEVERAL DAYS
5. POOR APPETITE OR OVEREATING: MORE THAN HALF THE DAYS
2. FEELING DOWN, DEPRESSED OR HOPELESS: SEVERAL DAYS
9. THOUGHTS THAT YOU WOULD BE BETTER OFF DEAD, OR OF HURTING YOURSELF: NOT AT ALL
8. MOVING OR SPEAKING SO SLOWLY THAT OTHER PEOPLE COULD HAVE NOTICED. OR THE OPPOSITE, BEING SO FIGETY OR RESTLESS THAT YOU HAVE BEEN MOVING AROUND A LOT MORE THAN USUAL: SEVERAL DAYS
7. TROUBLE CONCENTRATING ON THINGS, SUCH AS READING THE NEWSPAPER OR WATCHING TELEVISION: SEVERAL DAYS
4. FEELING TIRED OR HAVING LITTLE ENERGY: MORE THAN HALF THE DAYS
6. FEELING BAD ABOUT YOURSELF - OR THAT YOU ARE A FAILURE OR HAVE LET YOURSELF OR YOUR FAMILY DOWN: SEVERAL DAYS
8. MOVING OR SPEAKING SO SLOWLY THAT OTHER PEOPLE COULD HAVE NOTICED. OR THE OPPOSITE - BEING SO FIDGETY OR RESTLESS THAT YOU HAVE BEEN MOVING AROUND A LOT MORE THAN USUAL: SEVERAL DAYS
3. TROUBLE FALLING OR STAYING ASLEEP: MORE THAN HALF THE DAYS
6. FEELING BAD ABOUT YOURSELF - OR THAT YOU ARE A FAILURE OR HAVE LET YOURSELF OR YOUR FAMILY DOWN: SEVERAL DAYS
SUM OF ALL RESPONSES TO PHQ QUESTIONS 1-9: 11
4. FEELING TIRED OR HAVING LITTLE ENERGY: MORE THAN HALF THE DAYS
5. POOR APPETITE OR OVEREATING: MORE THAN HALF THE DAYS
1. LITTLE INTEREST OR PLEASURE IN DOING THINGS: SEVERAL DAYS
7. TROUBLE CONCENTRATING ON THINGS, SUCH AS READING THE NEWSPAPER OR WATCHING TELEVISION: SEVERAL DAYS
SUM OF ALL RESPONSES TO PHQ QUESTIONS 1-9: 11
10. IF YOU CHECKED OFF ANY PROBLEMS, HOW DIFFICULT HAVE THESE PROBLEMS MADE IT FOR YOU TO DO YOUR WORK, TAKE CARE OF THINGS AT HOME, OR GET ALONG WITH OTHER PEOPLE: SOMEWHAT DIFFICULT

## 2025-07-16 ASSESSMENT — ANXIETY QUESTIONNAIRES
1. FEELING NERVOUS, ANXIOUS, OR ON EDGE: MORE THAN HALF THE DAYS
7. FEELING AFRAID AS IF SOMETHING AWFUL MIGHT HAPPEN: NOT AT ALL
3. WORRYING TOO MUCH ABOUT DIFFERENT THINGS: MORE THAN HALF THE DAYS
2. NOT BEING ABLE TO STOP OR CONTROL WORRYING: MORE THAN HALF THE DAYS
5. BEING SO RESTLESS THAT IT IS HARD TO SIT STILL: SEVERAL DAYS
5. BEING SO RESTLESS THAT IT IS HARD TO SIT STILL: SEVERAL DAYS
6. BECOMING EASILY ANNOYED OR IRRITABLE: SEVERAL DAYS
IF YOU CHECKED OFF ANY PROBLEMS ON THIS QUESTIONNAIRE, HOW DIFFICULT HAVE THESE PROBLEMS MADE IT FOR YOU TO DO YOUR WORK, TAKE CARE OF THINGS AT HOME, OR GET ALONG WITH OTHER PEOPLE: SOMEWHAT DIFFICULT
GAD7 TOTAL SCORE: 10
4. TROUBLE RELAXING: MORE THAN HALF THE DAYS
6. BECOMING EASILY ANNOYED OR IRRITABLE: SEVERAL DAYS
4. TROUBLE RELAXING: MORE THAN HALF THE DAYS
3. WORRYING TOO MUCH ABOUT DIFFERENT THINGS: MORE THAN HALF THE DAYS
2. NOT BEING ABLE TO STOP OR CONTROL WORRYING: MORE THAN HALF THE DAYS
7. FEELING AFRAID AS IF SOMETHING AWFUL MIGHT HAPPEN: NOT AT ALL
1. FEELING NERVOUS, ANXIOUS, OR ON EDGE: MORE THAN HALF THE DAYS
IF YOU CHECKED OFF ANY PROBLEMS ON THIS QUESTIONNAIRE, HOW DIFFICULT HAVE THESE PROBLEMS MADE IT FOR YOU TO DO YOUR WORK, TAKE CARE OF THINGS AT HOME, OR GET ALONG WITH OTHER PEOPLE: SOMEWHAT DIFFICULT

## 2025-07-21 NOTE — PROGRESS NOTES
BEHAVIORAL HEALTH FOLLOW UP  AKILAH Mena  Behavioral Health Consultant      Visit Date: 7/16/2025   Time of appointment:  9am   Total time spent for this encounter: 55  This is patient's 23 appointment.    Pt and/or guardian was educated on the model of service to include a short-term intervention focused approach and as well as the limits of confidentiality (i.e. abuse reporting, suicide intervention, etc.).  Pt and/or guardian indicated understanding. Pt and/or guardian provided informed consent for the behavioral health program.  Yuliya Merino, was evaluated through a synchronous (real-time) audio-video encounter. The patient (or guardian if applicable) is aware that this is a billable service, which includes applicable co-pays. This Virtual Visit was conducted with patient's (and/or legal guardian's) consent. Patient identification was verified, and a caregiver was present when appropriate.   The patient was located at Home: [address] [place] [id number]  Provider was located at Home (Appt Dept State): [place]  Confirm you are appropriately licensed, registered, or certified to deliver care in the state where the patient is located as indicated above. If you are not or unsure, please re-schedule the visit: Yes, I confirm.        PCP: Malorie Rasmussen DO    Reason for Consult:  Follow-up       MARYAM Dwyer is a 34 y.o. female who presents for follow up of depression, stress, and relationship problems. She reports since the last visit things have changed in her recent relationship. Discusses new information and now having decided to leave the relationship. Pt processes this turn of events in her relationship and emotions and decisions she has made since.      OBJECTIVE  Affective and emotional state appeared nervous.   Suicidal thoughts or behaviors not present  Homicidal thoughts or behaviors not present  Hygiene was good   Dress was appropriate  Behavior was Calm and engaged  Attitude

## 2025-08-05 ENCOUNTER — TELEMEDICINE (OUTPATIENT)
Dept: BEHAVIORAL/MENTAL HEALTH CLINIC | Age: 35
End: 2025-08-05
Payer: MEDICAID

## 2025-08-05 DIAGNOSIS — F43.23 ADJUSTMENT DISORDER WITH MIXED ANXIETY AND DEPRESSED MOOD: Primary | ICD-10-CM

## 2025-08-05 PROCEDURE — 90837 PSYTX W PT 60 MINUTES: CPT | Performed by: SOCIAL WORKER

## 2025-08-05 PROCEDURE — 4004F PT TOBACCO SCREEN RCVD TLK: CPT | Performed by: SOCIAL WORKER

## 2025-08-05 ASSESSMENT — PATIENT HEALTH QUESTIONNAIRE - PHQ9
4. FEELING TIRED OR HAVING LITTLE ENERGY: MORE THAN HALF THE DAYS
SUM OF ALL RESPONSES TO PHQ QUESTIONS 1-9: 8
6. FEELING BAD ABOUT YOURSELF - OR THAT YOU ARE A FAILURE OR HAVE LET YOURSELF OR YOUR FAMILY DOWN: NOT AT ALL
1. LITTLE INTEREST OR PLEASURE IN DOING THINGS: SEVERAL DAYS
8. MOVING OR SPEAKING SO SLOWLY THAT OTHER PEOPLE COULD HAVE NOTICED. OR THE OPPOSITE - BEING SO FIDGETY OR RESTLESS THAT YOU HAVE BEEN MOVING AROUND A LOT MORE THAN USUAL: SEVERAL DAYS
SUM OF ALL RESPONSES TO PHQ QUESTIONS 1-9: 8
2. FEELING DOWN, DEPRESSED OR HOPELESS: NOT AT ALL
5. POOR APPETITE OR OVEREATING: MORE THAN HALF THE DAYS
9. THOUGHTS THAT YOU WOULD BE BETTER OFF DEAD, OR OF HURTING YOURSELF: NOT AT ALL
5. POOR APPETITE OR OVEREATING: MORE THAN HALF THE DAYS
3. TROUBLE FALLING OR STAYING ASLEEP: MORE THAN HALF THE DAYS
SUM OF ALL RESPONSES TO PHQ QUESTIONS 1-9: 8
8. MOVING OR SPEAKING SO SLOWLY THAT OTHER PEOPLE COULD HAVE NOTICED. OR THE OPPOSITE, BEING SO FIGETY OR RESTLESS THAT YOU HAVE BEEN MOVING AROUND A LOT MORE THAN USUAL: SEVERAL DAYS
6. FEELING BAD ABOUT YOURSELF - OR THAT YOU ARE A FAILURE OR HAVE LET YOURSELF OR YOUR FAMILY DOWN: NOT AT ALL
7. TROUBLE CONCENTRATING ON THINGS, SUCH AS READING THE NEWSPAPER OR WATCHING TELEVISION: NOT AT ALL
9. THOUGHTS THAT YOU WOULD BE BETTER OFF DEAD, OR OF HURTING YOURSELF: NOT AT ALL
4. FEELING TIRED OR HAVING LITTLE ENERGY: MORE THAN HALF THE DAYS
10. IF YOU CHECKED OFF ANY PROBLEMS, HOW DIFFICULT HAVE THESE PROBLEMS MADE IT FOR YOU TO DO YOUR WORK, TAKE CARE OF THINGS AT HOME, OR GET ALONG WITH OTHER PEOPLE: SOMEWHAT DIFFICULT
2. FEELING DOWN, DEPRESSED OR HOPELESS: NOT AT ALL
1. LITTLE INTEREST OR PLEASURE IN DOING THINGS: SEVERAL DAYS
SUM OF ALL RESPONSES TO PHQ QUESTIONS 1-9: 8
7. TROUBLE CONCENTRATING ON THINGS, SUCH AS READING THE NEWSPAPER OR WATCHING TELEVISION: NOT AT ALL
10. IF YOU CHECKED OFF ANY PROBLEMS, HOW DIFFICULT HAVE THESE PROBLEMS MADE IT FOR YOU TO DO YOUR WORK, TAKE CARE OF THINGS AT HOME, OR GET ALONG WITH OTHER PEOPLE: SOMEWHAT DIFFICULT
SUM OF ALL RESPONSES TO PHQ QUESTIONS 1-9: 8
3. TROUBLE FALLING OR STAYING ASLEEP: MORE THAN HALF THE DAYS

## 2025-08-05 ASSESSMENT — ANXIETY QUESTIONNAIRES
7. FEELING AFRAID AS IF SOMETHING AWFUL MIGHT HAPPEN: NOT AT ALL
2. NOT BEING ABLE TO STOP OR CONTROL WORRYING: SEVERAL DAYS
5. BEING SO RESTLESS THAT IT IS HARD TO SIT STILL: SEVERAL DAYS
IF YOU CHECKED OFF ANY PROBLEMS ON THIS QUESTIONNAIRE, HOW DIFFICULT HAVE THESE PROBLEMS MADE IT FOR YOU TO DO YOUR WORK, TAKE CARE OF THINGS AT HOME, OR GET ALONG WITH OTHER PEOPLE: SOMEWHAT DIFFICULT
5. BEING SO RESTLESS THAT IT IS HARD TO SIT STILL: SEVERAL DAYS
1. FEELING NERVOUS, ANXIOUS, OR ON EDGE: SEVERAL DAYS
GAD7 TOTAL SCORE: 6
4. TROUBLE RELAXING: SEVERAL DAYS
1. FEELING NERVOUS, ANXIOUS, OR ON EDGE: SEVERAL DAYS
3. WORRYING TOO MUCH ABOUT DIFFERENT THINGS: SEVERAL DAYS
3. WORRYING TOO MUCH ABOUT DIFFERENT THINGS: SEVERAL DAYS
2. NOT BEING ABLE TO STOP OR CONTROL WORRYING: SEVERAL DAYS
6. BECOMING EASILY ANNOYED OR IRRITABLE: SEVERAL DAYS
4. TROUBLE RELAXING: SEVERAL DAYS
6. BECOMING EASILY ANNOYED OR IRRITABLE: SEVERAL DAYS
IF YOU CHECKED OFF ANY PROBLEMS ON THIS QUESTIONNAIRE, HOW DIFFICULT HAVE THESE PROBLEMS MADE IT FOR YOU TO DO YOUR WORK, TAKE CARE OF THINGS AT HOME, OR GET ALONG WITH OTHER PEOPLE: SOMEWHAT DIFFICULT
7. FEELING AFRAID AS IF SOMETHING AWFUL MIGHT HAPPEN: NOT AT ALL

## (undated) DEVICE — ELECTRODE ARTHSCP 15X11MM SHFT 11CM MPLR LOOP GRN DISP W/

## (undated) DEVICE — ELECTRODE ES L11CM DIA5MM BALL SHFT RED DISP UTAHLOOP

## (undated) DEVICE — NEEDLE SPNL 22GA L3.5IN BLK WHTACR PNCL PNT HI FLO DISP

## (undated) DEVICE — PROCTO SWABS: Brand: DEROYAL

## (undated) DEVICE — GLOVE SURG BEAD CUF 7 STD PF WHT STRL TRIUMPH LT LTX

## (undated) DEVICE — PENCIL ES L3M BTTN SWCH HOLSTER W/ BLDE ELECTRD EDGE

## (undated) DEVICE — ELECTRODE ENDO L5XW5MM 11CM PUR SHFT MPLR RND LOOP DISP

## (undated) DEVICE — ST CHARLES PERI-GYN PACK: Brand: MEDLINE INDUSTRIES, INC.

## (undated) DEVICE — SYRINGE MED 10ML SLIP TIP BLNT FILL AND LUERLOCK DISP

## (undated) DEVICE — SUTURE VCRL + SZ 3-0 L27IN ABSRB UD L26MM SH 1/2 CIR VCP416H

## (undated) DEVICE — GOWN,AURORA,NONREINFORCED,LARGE: Brand: MEDLINE